# Patient Record
Sex: MALE | Race: WHITE | NOT HISPANIC OR LATINO | Employment: FULL TIME | URBAN - METROPOLITAN AREA
[De-identification: names, ages, dates, MRNs, and addresses within clinical notes are randomized per-mention and may not be internally consistent; named-entity substitution may affect disease eponyms.]

---

## 2023-12-30 ENCOUNTER — HOSPITAL ENCOUNTER (EMERGENCY)
Facility: HOSPITAL | Age: 43
Discharge: HOME | End: 2023-12-30
Attending: EMERGENCY MEDICINE
Payer: MEDICAID

## 2023-12-30 VITALS
TEMPERATURE: 98.1 F | BODY MASS INDEX: 35.16 KG/M2 | HEIGHT: 65 IN | RESPIRATION RATE: 20 BRPM | HEART RATE: 108 BPM | SYSTOLIC BLOOD PRESSURE: 160 MMHG | DIASTOLIC BLOOD PRESSURE: 95 MMHG | OXYGEN SATURATION: 94 % | WEIGHT: 211 LBS

## 2023-12-30 DIAGNOSIS — L02.01 FACIAL ABSCESS: Primary | ICD-10-CM

## 2023-12-30 PROCEDURE — 10061 I&D ABSCESS COMP/MULTIPLE: CPT | Performed by: EMERGENCY MEDICINE

## 2023-12-30 PROCEDURE — 2500000005 HC RX 250 GENERAL PHARMACY W/O HCPCS: Performed by: EMERGENCY MEDICINE

## 2023-12-30 PROCEDURE — 99283 EMERGENCY DEPT VISIT LOW MDM: CPT | Mod: 25

## 2023-12-30 PROCEDURE — 99283 EMERGENCY DEPT VISIT LOW MDM: CPT | Performed by: EMERGENCY MEDICINE

## 2023-12-30 RX ORDER — LIDOCAINE HYDROCHLORIDE 10 MG/ML
10 INJECTION INFILTRATION; PERINEURAL ONCE
Status: COMPLETED | OUTPATIENT
Start: 2023-12-30 | End: 2023-12-30

## 2023-12-30 RX ORDER — CLINDAMYCIN HYDROCHLORIDE 300 MG/1
300 CAPSULE ORAL 3 TIMES DAILY
Qty: 30 CAPSULE | Refills: 0 | Status: SHIPPED | OUTPATIENT
Start: 2023-12-30 | End: 2024-01-09

## 2023-12-30 RX ADMIN — LIDOCAINE HYDROCHLORIDE 100 MG: 10 INJECTION, SOLUTION INFILTRATION; PERINEURAL at 09:28

## 2023-12-30 ASSESSMENT — PAIN - FUNCTIONAL ASSESSMENT: PAIN_FUNCTIONAL_ASSESSMENT: 0-10

## 2023-12-30 ASSESSMENT — COLUMBIA-SUICIDE SEVERITY RATING SCALE - C-SSRS
2. HAVE YOU ACTUALLY HAD ANY THOUGHTS OF KILLING YOURSELF?: NO
1. IN THE PAST MONTH, HAVE YOU WISHED YOU WERE DEAD OR WISHED YOU COULD GO TO SLEEP AND NOT WAKE UP?: NO
6. HAVE YOU EVER DONE ANYTHING, STARTED TO DO ANYTHING, OR PREPARED TO DO ANYTHING TO END YOUR LIFE?: NO

## 2023-12-30 ASSESSMENT — PAIN SCALES - GENERAL: PAINLEVEL_OUTOF10: 7

## 2023-12-30 NOTE — ED PROVIDER NOTES
HPI   Chief Complaint   Patient presents with   • BIG PIMPLE ABSCESS FROM TOOTH ON NOSE        Presents with left facial abscess near the right nasolabial fold.  It has been there for few days.  Nothing has been draining out of it.  It painful locally to the area.  No vision changes.  The patient states he has had multiple of these in the past and has had I&D multiple times over the past 2 years.  Has occurred in the same spot over the past couple years.  He attributed to his bad teeth.  He states that he is following up with a dentist in February.  He denies having any fevers.  He tried to drain it this morning at home without any success.  He has been taking Augmentin for a few days but it has also not helped.                          [place] Coma Scale Score: 15                  Patient History   Past Medical History:   Diagnosis Date   • Asthma    • HTN (hypertension)      No past surgical history on file.  No family history on file.  Social History     Tobacco Use   • Smoking status: Every Day     Types: Cigarettes   • Smokeless tobacco: Never   Substance Use Topics   • Alcohol use: Never   • Drug use: Never       Physical Exam   ED Triage Vitals [12/30/23 0858]   Temp Heart Rate Resp BP   36.7 °C (98.1 °F) 108 20 (!) 160/95      SpO2 Temp src Heart Rate Source Patient Position   94 % -- -- --      BP Location FiO2 (%)     -- --       Physical Exam  Constitutional:       Appearance: Normal appearance.   HENT:      Head: Normocephalic and atraumatic.      Comments: 2 x 1.5 cm abscess noted between the inner canthus and nose on the right-hand side of the face.  No drainage, positive fluctuance, mild erythema  Eyes:      Extraocular Movements: Extraocular movements intact.      Pupils: Pupils are equal, round, and reactive to light.   Musculoskeletal:         General: Normal range of motion.      Cervical back: Normal range of motion.   Skin:     General: Skin is warm and dry.   Neurological:      General: No  focal deficit present.      Mental Status: He is alert and oriented to person, place, and time.      Motor: No weakness.   Psychiatric:         Mood and Affect: Mood normal.       Labs Reviewed - No data to display  No orders to display     ED Course & MDM   Diagnoses as of 12/30/23 0944   Facial abscess       Medical Decision Making  Differentials include facial abscess, infected cyst. Imaging studies, if performed, were independently reviewed and interpreted by myself and confirmed by radiologist. EKG(s), if performed, were interpreted by myself. The patient had incision and drainage of the facial abscess.  A moderate amount of purulent material returned.  I will switch his antibiotic to clindamycin.  He is going to follow-up with his dentist.        Procedure  Incision and Drainage    Performed by: Bubba Mireles MD  Authorized by: Bubba Mireles MD    Consent:     Consent obtained:  Verbal    Consent given by:  Patient    Risks, benefits, and alternatives were discussed: yes      Risks discussed:  Bleeding, incomplete drainage and pain  Universal protocol:     Procedure explained and questions answered to patient or proxy's satisfaction: yes      Patient identity confirmed:  Verbally with patient  Location:     Type:  Abscess    Location:  Head    Head location:  Face  Pre-procedure details:     Skin preparation:  Antiseptic wash  Sedation:     Sedation type:  None  Anesthesia:     Anesthesia method:  Local infiltration    Local anesthetic:  Lidocaine 1% w/o epi  Procedure type:     Complexity:  Complex  Procedure details:     Ultrasound guidance: no      Needle aspiration: no      Incision types:  Cruciate    Incision depth:  Dermal    Wound management:  Probed and deloculated    Drainage:  Purulent    Drainage amount:  Moderate    Wound treatment:  Wound left open    Packing materials:  None  Post-procedure details:     Procedure completion:  Tolerated well, no immediate complications       Bubba Mireles  MD  12/30/23 0944

## 2024-03-12 ENCOUNTER — CLINICAL SUPPORT (OUTPATIENT)
Dept: SLEEP MEDICINE | Facility: CLINIC | Age: 44
End: 2024-03-12
Payer: COMMERCIAL

## 2024-03-12 DIAGNOSIS — G47.33 OBSTRUCTIVE SLEEP APNEA (ADULT) (PEDIATRIC): ICD-10-CM

## 2024-03-12 DIAGNOSIS — G47.61 PERIODIC LIMB MOVEMENT DISORDER: ICD-10-CM

## 2024-03-12 DIAGNOSIS — G47.30 SLEEP APNEA, UNSPECIFIED: ICD-10-CM

## 2024-03-12 PROCEDURE — 95810 POLYSOM 6/> YRS 4/> PARAM: CPT | Mod: 52 | Performed by: INTERNAL MEDICINE

## 2024-03-13 VITALS
WEIGHT: 210.98 LBS | BODY MASS INDEX: 35.15 KG/M2 | SYSTOLIC BLOOD PRESSURE: 160 MMHG | HEIGHT: 65 IN | DIASTOLIC BLOOD PRESSURE: 95 MMHG

## 2024-03-13 ASSESSMENT — SLEEP AND FATIGUE QUESTIONNAIRES
HOW LIKELY ARE YOU TO NOD OFF OR FALL ASLEEP WHILE LYING DOWN TO REST IN THE AFTERNOON WHEN CIRCUMSTANCES PERMIT: MODERATE CHANCE OF DOZING
HOW LIKELY ARE YOU TO NOD OFF OR FALL ASLEEP WHILE SITTING QUIETLY AFTER LUNCH WITHOUT ALCOHOL: SLIGHT CHANCE OF DOZING
HOW LIKELY ARE YOU TO NOD OFF OR FALL ASLEEP IN A CAR, WHILE STOPPED FOR A FEW MINUTES IN TRAFFIC: WOULD NEVER DOZE
ESS-CHAD TOTAL SCORE: 10
HOW LIKELY ARE YOU TO NOD OFF OR FALL ASLEEP WHILE SITTING AND TALKING TO SOMEONE: WOULD NEVER DOZE
HOW LIKELY ARE YOU TO NOD OFF OR FALL ASLEEP WHEN YOU ARE A PASSENGER IN A CAR FOR AN HOUR WITHOUT A BREAK: HIGH CHANCE OF DOZING
SITING INACTIVE IN A PUBLIC PLACE LIKE A CLASS ROOM OR A MOVIE THEATER: MODERATE CHANCE OF DOZING
HOW LIKELY ARE YOU TO NOD OFF OR FALL ASLEEP WHILE SITTING AND READING: WOULD NEVER DOZE
HOW LIKELY ARE YOU TO NOD OFF OR FALL ASLEEP WHILE WATCHING TV: MODERATE CHANCE OF DOZING

## 2024-03-13 NOTE — PROGRESS NOTES
[facility] TECH NOTE:     Patient: Murali Love   MRN//AGE: [id number]  1980  44 y.o.   Technologist: Toya Murillo   Room: 4   Service Date: 3/13/2024        Sleep Testing Location: [facility]:     TECHNOLOGIST SLEEP STUDY PROCEDURE NOTE:   This sleep study is being conducted according to the policies and procedures outlined by the [employer]AS accreditation standards.  The sleep study procedure and processes involved during this appointment was explained to the patient/patient’s family, questions were answered. The patient/family verbalized understanding.      The patient is a 44 y.o. year old male scheduled for a Split Night Study  with montage of:  standard . he arrived for his appointment.      The study that was ultimately completed was a diagnostic PSG  with montage of:  standard .    The full study Was completed.  Patient questionnaires completed?: yes     Consents signed? yes    Initial Fall Risk Screening:     Murali has not fallen in the last 6 months. his did not result in injury. Murali does not have a fear of falling. He does not need assistance with sitting, standing, or walking. he does not need assistance walking in his home. he does not need assistance in an unfamiliar setting. The patient is notusing an assistive device.     Brief Study observations: The on-call (Dr. Burkett) was contacted due to low sats (per protocol to add supplemental 02 ) and Dr. Burkett advised to monitor the patient sats and try to wait an hour to see if sats increase as not to mask possible apneas. If sats remain low tech may add 1L 02. The patients sats increased so supplemental 02 was not added.     Deviation to order/protocol and reason: The patient did not demonstrate enough respiratory events to meet split criteria.       If PAP, which was preferred mask/pressure/mode: N/A      Other: The patient was becoming restless towards the last hour of the study and wanted to end it early. The full (720 epoch) study recording time  was completed.     After the procedure, the patient/family was informed to ensure followup with ordering clinician for testing results.      Technologist: Toya Murillo

## 2024-09-02 ENCOUNTER — HOSPITAL ENCOUNTER (INPATIENT)
Facility: HOSPITAL | Age: 44
LOS: 1 days | Discharge: HOME | End: 2024-09-03
Attending: STUDENT IN AN ORGANIZED HEALTH CARE EDUCATION/TRAINING PROGRAM | Admitting: INTERNAL MEDICINE
Payer: COMMERCIAL

## 2024-09-02 ENCOUNTER — APPOINTMENT (OUTPATIENT)
Dept: CARDIOLOGY | Facility: HOSPITAL | Age: 44
End: 2024-09-02
Payer: COMMERCIAL

## 2024-09-02 DIAGNOSIS — I21.11 ST ELEVATION (STEMI) MYOCARDIAL INFARCTION INVOLVING RIGHT CORONARY ARTERY (MULTI): ICD-10-CM

## 2024-09-02 DIAGNOSIS — I21.3 STEMI (ST ELEVATION MYOCARDIAL INFARCTION) (MULTI): ICD-10-CM

## 2024-09-02 DIAGNOSIS — I21.3 ST ELEVATION MYOCARDIAL INFARCTION (STEMI), UNSPECIFIED ARTERY (MULTI): Primary | ICD-10-CM

## 2024-09-02 DIAGNOSIS — F17.200 TOBACCO USE DISORDER: ICD-10-CM

## 2024-09-02 LAB
ACT BLD: 187 SEC (ref 83–199)
ACT BLD: NORMAL S
ALBUMIN SERPL BCP-MCNC: 4.6 G/DL (ref 3.4–5)
ALP SERPL-CCNC: 75 U/L (ref 33–120)
ALT SERPL W P-5'-P-CCNC: 25 U/L (ref 10–52)
ANION GAP SERPL CALC-SCNC: 15 MMOL/L (ref 10–20)
AST SERPL W P-5'-P-CCNC: 25 U/L (ref 9–39)
BASOPHILS # BLD AUTO: 0.12 X10*3/UL (ref 0–0.1)
BASOPHILS NFR BLD AUTO: 0.8 %
BILIRUB SERPL-MCNC: 0.6 MG/DL (ref 0–1.2)
BUN SERPL-MCNC: 17 MG/DL (ref 6–23)
CALCIUM SERPL-MCNC: 9.7 MG/DL (ref 8.6–10.3)
CARDIAC TROPONIN I PNL SERPL HS: 9 NG/L (ref 0–20)
CHLORIDE SERPL-SCNC: 101 MMOL/L (ref 98–107)
CO2 SERPL-SCNC: 20 MMOL/L (ref 21–32)
CREAT SERPL-MCNC: 1.36 MG/DL (ref 0.5–1.3)
EGFRCR SERPLBLD CKD-EPI 2021: 65 ML/MIN/1.73M*2
EOSINOPHIL # BLD AUTO: 0.24 X10*3/UL (ref 0–0.7)
EOSINOPHIL NFR BLD AUTO: 1.6 %
ERYTHROCYTE [DISTWIDTH] IN BLOOD BY AUTOMATED COUNT: 15.4 % (ref 11.5–14.5)
GLUCOSE BLD MANUAL STRIP-MCNC: 164 MG/DL (ref 74–99)
GLUCOSE SERPL-MCNC: 154 MG/DL (ref 74–99)
HCT VFR BLD AUTO: 56 % (ref 41–52)
HGB BLD-MCNC: 19.2 G/DL (ref 13.5–17.5)
HOLD SPECIMEN: NORMAL
IMM GRANULOCYTES # BLD AUTO: 0.04 X10*3/UL (ref 0–0.7)
IMM GRANULOCYTES NFR BLD AUTO: 0.3 % (ref 0–0.9)
LYMPHOCYTES # BLD AUTO: 4.82 X10*3/UL (ref 1.2–4.8)
LYMPHOCYTES NFR BLD AUTO: 31.6 %
MCH RBC QN AUTO: 28.2 PG (ref 26–34)
MCHC RBC AUTO-ENTMCNC: 34.3 G/DL (ref 32–36)
MCV RBC AUTO: 82 FL (ref 80–100)
MONOCYTES # BLD AUTO: 1.19 X10*3/UL (ref 0.1–1)
MONOCYTES NFR BLD AUTO: 7.8 %
NEUTROPHILS # BLD AUTO: 8.83 X10*3/UL (ref 1.2–7.7)
NEUTROPHILS NFR BLD AUTO: 57.9 %
NRBC BLD-RTO: 0 /100 WBCS (ref 0–0)
PLATELET # BLD AUTO: 248 X10*3/UL (ref 150–450)
POTASSIUM SERPL-SCNC: 3.8 MMOL/L (ref 3.5–5.3)
PROT SERPL-MCNC: 7.3 G/DL (ref 6.4–8.2)
RBC # BLD AUTO: 6.8 X10*6/UL (ref 4.5–5.9)
SODIUM SERPL-SCNC: 132 MMOL/L (ref 136–145)
WBC # BLD AUTO: 15.2 X10*3/UL (ref 4.4–11.3)

## 2024-09-02 PROCEDURE — 2020000001 HC ICU ROOM DAILY

## 2024-09-02 PROCEDURE — 99153 MOD SED SAME PHYS/QHP EA: CPT | Performed by: INTERNAL MEDICINE

## 2024-09-02 PROCEDURE — 2500000002 HC RX 250 W HCPCS SELF ADMINISTERED DRUGS (ALT 637 FOR MEDICARE OP, ALT 636 FOR OP/ED): Performed by: INTERNAL MEDICINE

## 2024-09-02 PROCEDURE — 99254 IP/OBS CNSLTJ NEW/EST MOD 60: CPT | Performed by: STUDENT IN AN ORGANIZED HEALTH CARE EDUCATION/TRAINING PROGRAM

## 2024-09-02 PROCEDURE — 4A023N7 MEASUREMENT OF CARDIAC SAMPLING AND PRESSURE, LEFT HEART, PERCUTANEOUS APPROACH: ICD-10-PCS | Performed by: INTERNAL MEDICINE

## 2024-09-02 PROCEDURE — 2500000001 HC RX 250 WO HCPCS SELF ADMINISTERED DRUGS (ALT 637 FOR MEDICARE OP): Performed by: STUDENT IN AN ORGANIZED HEALTH CARE EDUCATION/TRAINING PROGRAM

## 2024-09-02 PROCEDURE — C9606 PERC D-E COR REVASC W AMI S: HCPCS | Performed by: INTERNAL MEDICINE

## 2024-09-02 PROCEDURE — C1769 GUIDE WIRE: HCPCS | Performed by: INTERNAL MEDICINE

## 2024-09-02 PROCEDURE — 83036 HEMOGLOBIN GLYCOSYLATED A1C: CPT | Performed by: INTERNAL MEDICINE

## 2024-09-02 PROCEDURE — 2780000003 HC OR 278 NO HCPCS: Performed by: INTERNAL MEDICINE

## 2024-09-02 PROCEDURE — 84484 ASSAY OF TROPONIN QUANT: CPT | Performed by: STUDENT IN AN ORGANIZED HEALTH CARE EDUCATION/TRAINING PROGRAM

## 2024-09-02 PROCEDURE — 93010 ELECTROCARDIOGRAM REPORT: CPT | Performed by: INTERNAL MEDICINE

## 2024-09-02 PROCEDURE — C1887 CATHETER, GUIDING: HCPCS | Performed by: INTERNAL MEDICINE

## 2024-09-02 PROCEDURE — 82947 ASSAY GLUCOSE BLOOD QUANT: CPT

## 2024-09-02 PROCEDURE — 2500000005 HC RX 250 GENERAL PHARMACY W/O HCPCS: Performed by: INTERNAL MEDICINE

## 2024-09-02 PROCEDURE — 99291 CRITICAL CARE FIRST HOUR: CPT | Performed by: INTERNAL MEDICINE

## 2024-09-02 PROCEDURE — S4991 NICOTINE PATCH NONLEGEND: HCPCS | Performed by: STUDENT IN AN ORGANIZED HEALTH CARE EDUCATION/TRAINING PROGRAM

## 2024-09-02 PROCEDURE — 2500000004 HC RX 250 GENERAL PHARMACY W/ HCPCS (ALT 636 FOR OP/ED): Performed by: INTERNAL MEDICINE

## 2024-09-02 PROCEDURE — 93458 L HRT ARTERY/VENTRICLE ANGIO: CPT | Performed by: INTERNAL MEDICINE

## 2024-09-02 PROCEDURE — C1725 CATH, TRANSLUMIN NON-LASER: HCPCS | Performed by: INTERNAL MEDICINE

## 2024-09-02 PROCEDURE — 85347 COAGULATION TIME ACTIVATED: CPT

## 2024-09-02 PROCEDURE — 80053 COMPREHEN METABOLIC PANEL: CPT | Performed by: STUDENT IN AN ORGANIZED HEALTH CARE EDUCATION/TRAINING PROGRAM

## 2024-09-02 PROCEDURE — 92978 ENDOLUMINL IVUS OCT C 1ST: CPT | Performed by: INTERNAL MEDICINE

## 2024-09-02 PROCEDURE — 36415 COLL VENOUS BLD VENIPUNCTURE: CPT | Performed by: STUDENT IN AN ORGANIZED HEALTH CARE EDUCATION/TRAINING PROGRAM

## 2024-09-02 PROCEDURE — 2720000007 HC OR 272 NO HCPCS: Performed by: INTERNAL MEDICINE

## 2024-09-02 PROCEDURE — 2500000004 HC RX 250 GENERAL PHARMACY W/ HCPCS (ALT 636 FOR OP/ED): Performed by: STUDENT IN AN ORGANIZED HEALTH CARE EDUCATION/TRAINING PROGRAM

## 2024-09-02 PROCEDURE — 93005 ELECTROCARDIOGRAM TRACING: CPT

## 2024-09-02 PROCEDURE — 2500000002 HC RX 250 W HCPCS SELF ADMINISTERED DRUGS (ALT 637 FOR MEDICARE OP, ALT 636 FOR OP/ED): Performed by: STUDENT IN AN ORGANIZED HEALTH CARE EDUCATION/TRAINING PROGRAM

## 2024-09-02 PROCEDURE — 2550000001 HC RX 255 CONTRASTS: Performed by: INTERNAL MEDICINE

## 2024-09-02 PROCEDURE — C1760 CLOSURE DEV, VASC: HCPCS | Performed by: INTERNAL MEDICINE

## 2024-09-02 PROCEDURE — C1753 CATH, INTRAVAS ULTRASOUND: HCPCS | Performed by: INTERNAL MEDICINE

## 2024-09-02 PROCEDURE — 96374 THER/PROPH/DIAG INJ IV PUSH: CPT | Mod: 59

## 2024-09-02 PROCEDURE — 99291 CRITICAL CARE FIRST HOUR: CPT

## 2024-09-02 PROCEDURE — 99152 MOD SED SAME PHYS/QHP 5/>YRS: CPT | Performed by: INTERNAL MEDICINE

## 2024-09-02 PROCEDURE — 85025 COMPLETE CBC W/AUTO DIFF WBC: CPT | Performed by: STUDENT IN AN ORGANIZED HEALTH CARE EDUCATION/TRAINING PROGRAM

## 2024-09-02 PROCEDURE — 2500000001 HC RX 250 WO HCPCS SELF ADMINISTERED DRUGS (ALT 637 FOR MEDICARE OP): Performed by: INTERNAL MEDICINE

## 2024-09-02 PROCEDURE — B2111ZZ FLUOROSCOPY OF MULTIPLE CORONARY ARTERIES USING LOW OSMOLAR CONTRAST: ICD-10-PCS | Performed by: INTERNAL MEDICINE

## 2024-09-02 PROCEDURE — 027034Z DILATION OF CORONARY ARTERY, ONE ARTERY WITH DRUG-ELUTING INTRALUMINAL DEVICE, PERCUTANEOUS APPROACH: ICD-10-PCS | Performed by: INTERNAL MEDICINE

## 2024-09-02 PROCEDURE — B240ZZ3 ULTRASONOGRAPHY OF SINGLE CORONARY ARTERY, INTRAVASCULAR: ICD-10-PCS | Performed by: INTERNAL MEDICINE

## 2024-09-02 PROCEDURE — C1894 INTRO/SHEATH, NON-LASER: HCPCS | Performed by: INTERNAL MEDICINE

## 2024-09-02 PROCEDURE — 92941 PRQ TRLML REVSC TOT OCCL AMI: CPT | Performed by: INTERNAL MEDICINE

## 2024-09-02 PROCEDURE — 96373 THER/PROPH/DIAG INJ IA: CPT | Performed by: INTERNAL MEDICINE

## 2024-09-02 PROCEDURE — C1874 STENT, COATED/COV W/DEL SYS: HCPCS | Performed by: INTERNAL MEDICINE

## 2024-09-02 DEVICE — STENT ONYXNG25022UX ONYX 2.50X22RX
Type: IMPLANTABLE DEVICE | Site: CORONARY | Status: FUNCTIONAL
Brand: ONYX FRONTIER™

## 2024-09-02 RX ORDER — LANOLIN ALCOHOL/MO/W.PET/CERES
400 CREAM (GRAM) TOPICAL
COMMUNITY
Start: 2024-08-13

## 2024-09-02 RX ORDER — GABAPENTIN 600 MG/1
600 TABLET ORAL DAILY
Status: DISCONTINUED | OUTPATIENT
Start: 2024-09-02 | End: 2024-09-03 | Stop reason: HOSPADM

## 2024-09-02 RX ORDER — NITROGLYCERIN 0.4 MG/1
0.4 TABLET SUBLINGUAL EVERY 5 MIN PRN
Status: DISCONTINUED | OUTPATIENT
Start: 2024-09-02 | End: 2024-09-02

## 2024-09-02 RX ORDER — ALBUTEROL SULFATE 90 UG/1
2 INHALANT RESPIRATORY (INHALATION) EVERY 6 HOURS PRN
COMMUNITY
Start: 2024-08-15

## 2024-09-02 RX ORDER — IBUPROFEN 200 MG
1 TABLET ORAL DAILY
Status: DISCONTINUED | OUTPATIENT
Start: 2024-09-02 | End: 2024-09-03 | Stop reason: HOSPADM

## 2024-09-02 RX ORDER — HEPARIN SODIUM 5000 [USP'U]/ML
4000 INJECTION, SOLUTION INTRAVENOUS; SUBCUTANEOUS ONCE
Status: COMPLETED | OUTPATIENT
Start: 2024-09-02 | End: 2024-09-02

## 2024-09-02 RX ORDER — NITROGLYCERIN 5 MG/ML
INJECTION, SOLUTION INTRAVENOUS AS NEEDED
Status: DISCONTINUED | OUTPATIENT
Start: 2024-09-02 | End: 2024-09-02 | Stop reason: HOSPADM

## 2024-09-02 RX ORDER — NAPROXEN SODIUM 220 MG/1
324 TABLET, FILM COATED ORAL ONCE
Status: COMPLETED | OUTPATIENT
Start: 2024-09-02 | End: 2024-09-02

## 2024-09-02 RX ORDER — ATORVASTATIN CALCIUM 40 MG/1
1 TABLET, FILM COATED ORAL
COMMUNITY
Start: 2024-07-18 | End: 2024-09-03 | Stop reason: HOSPADM

## 2024-09-02 RX ORDER — METOPROLOL TARTRATE 25 MG/1
25 TABLET, FILM COATED ORAL 2 TIMES DAILY
Status: DISCONTINUED | OUTPATIENT
Start: 2024-09-02 | End: 2024-09-03 | Stop reason: HOSPADM

## 2024-09-02 RX ORDER — LISINOPRIL 10 MG/1
1 TABLET ORAL
COMMUNITY
Start: 2024-07-18

## 2024-09-02 RX ORDER — ATORVASTATIN CALCIUM 40 MG/1
80 TABLET, FILM COATED ORAL NIGHTLY
Status: DISCONTINUED | OUTPATIENT
Start: 2024-09-02 | End: 2024-09-03 | Stop reason: HOSPADM

## 2024-09-02 RX ORDER — LISINOPRIL 10 MG/1
10 TABLET ORAL
Status: DISCONTINUED | OUTPATIENT
Start: 2024-09-02 | End: 2024-09-03 | Stop reason: HOSPADM

## 2024-09-02 RX ORDER — LIDOCAINE HYDROCHLORIDE 20 MG/ML
INJECTION, SOLUTION INFILTRATION; PERINEURAL AS NEEDED
Status: DISCONTINUED | OUTPATIENT
Start: 2024-09-02 | End: 2024-09-02 | Stop reason: HOSPADM

## 2024-09-02 RX ORDER — SODIUM CHLORIDE 9 MG/ML
100 INJECTION, SOLUTION INTRAVENOUS CONTINUOUS
Status: ACTIVE | OUTPATIENT
Start: 2024-09-02 | End: 2024-09-03

## 2024-09-02 RX ORDER — ALBUTEROL SULFATE 90 UG/1
2 INHALANT RESPIRATORY (INHALATION) EVERY 6 HOURS PRN
Status: DISCONTINUED | OUTPATIENT
Start: 2024-09-02 | End: 2024-09-03 | Stop reason: HOSPADM

## 2024-09-02 RX ORDER — HEPARIN SODIUM 1000 [USP'U]/ML
INJECTION, SOLUTION INTRAVENOUS; SUBCUTANEOUS AS NEEDED
Status: DISCONTINUED | OUTPATIENT
Start: 2024-09-02 | End: 2024-09-02 | Stop reason: HOSPADM

## 2024-09-02 RX ORDER — GABAPENTIN 600 MG/1
1 TABLET ORAL
COMMUNITY
Start: 2024-08-14

## 2024-09-02 RX ORDER — MIDAZOLAM HYDROCHLORIDE 1 MG/ML
INJECTION, SOLUTION INTRAMUSCULAR; INTRAVENOUS AS NEEDED
Status: DISCONTINUED | OUTPATIENT
Start: 2024-09-02 | End: 2024-09-02 | Stop reason: HOSPADM

## 2024-09-02 RX ORDER — ASPIRIN 81 MG/1
81 TABLET ORAL DAILY
Status: DISCONTINUED | OUTPATIENT
Start: 2024-09-03 | End: 2024-09-03 | Stop reason: HOSPADM

## 2024-09-02 RX ORDER — DULAGLUTIDE 0.75 MG/.5ML
0.75 INJECTION, SOLUTION SUBCUTANEOUS
COMMUNITY
Start: 2024-02-05

## 2024-09-02 RX ORDER — FENTANYL CITRATE 50 UG/ML
INJECTION, SOLUTION INTRAMUSCULAR; INTRAVENOUS AS NEEDED
Status: DISCONTINUED | OUTPATIENT
Start: 2024-09-02 | End: 2024-09-02 | Stop reason: HOSPADM

## 2024-09-02 SDOH — SOCIAL STABILITY: SOCIAL INSECURITY: DO YOU FEEL ANYONE HAS EXPLOITED OR TAKEN ADVANTAGE OF YOU FINANCIALLY OR OF YOUR PERSONAL PROPERTY?: NO

## 2024-09-02 SDOH — SOCIAL STABILITY: SOCIAL INSECURITY: DO YOU FEEL UNSAFE GOING BACK TO THE PLACE WHERE YOU ARE LIVING?: NO

## 2024-09-02 SDOH — SOCIAL STABILITY: SOCIAL INSECURITY: DOES ANYONE TRY TO KEEP YOU FROM HAVING/CONTACTING OTHER FRIENDS OR DOING THINGS OUTSIDE YOUR HOME?: NO

## 2024-09-02 SDOH — SOCIAL STABILITY: SOCIAL INSECURITY: HAVE YOU HAD ANY THOUGHTS OF HARMING ANYONE ELSE?: NO

## 2024-09-02 SDOH — SOCIAL STABILITY: SOCIAL INSECURITY: HAVE YOU HAD THOUGHTS OF HARMING ANYONE ELSE?: NO

## 2024-09-02 SDOH — SOCIAL STABILITY: SOCIAL INSECURITY: HAS ANYONE EVER THREATENED TO HURT YOUR FAMILY OR YOUR PETS?: NO

## 2024-09-02 SDOH — SOCIAL STABILITY: SOCIAL INSECURITY: ABUSE: ADULT

## 2024-09-02 SDOH — SOCIAL STABILITY: SOCIAL INSECURITY: ARE THERE ANY APPARENT SIGNS OF INJURIES/BEHAVIORS THAT COULD BE RELATED TO ABUSE/NEGLECT?: NO

## 2024-09-02 SDOH — SOCIAL STABILITY: SOCIAL INSECURITY: ARE YOU OR HAVE YOU BEEN THREATENED OR ABUSED PHYSICALLY, EMOTIONALLY, OR SEXUALLY BY ANYONE?: NO

## 2024-09-02 SDOH — SOCIAL STABILITY: SOCIAL INSECURITY: WERE YOU ABLE TO COMPLETE ALL THE BEHAVIORAL HEALTH SCREENINGS?: YES

## 2024-09-02 ASSESSMENT — PAIN SCALES - GENERAL
PAINLEVEL_OUTOF10: 7
PAINLEVEL_OUTOF10: 0 - NO PAIN
PAINLEVEL_OUTOF10: 1
PAINLEVEL_OUTOF10: 0 - NO PAIN
PAINLEVEL_OUTOF10: 7
PAINLEVEL_OUTOF10: 0 - NO PAIN
PAINLEVEL_OUTOF10: 4
PAINLEVEL_OUTOF10: 0 - NO PAIN

## 2024-09-02 ASSESSMENT — PAIN DESCRIPTION - LOCATION
LOCATION: CHEST
LOCATION: CHEST

## 2024-09-02 ASSESSMENT — PATIENT HEALTH QUESTIONNAIRE - PHQ9
1. LITTLE INTEREST OR PLEASURE IN DOING THINGS: NOT AT ALL
SUM OF ALL RESPONSES TO PHQ9 QUESTIONS 1 & 2: 0
2. FEELING DOWN, DEPRESSED OR HOPELESS: NOT AT ALL

## 2024-09-02 ASSESSMENT — ENCOUNTER SYMPTOMS
MUSCULOSKELETAL NEGATIVE: 1
ABDOMINAL PAIN: 0
NEUROLOGICAL NEGATIVE: 1
SHORTNESS OF BREATH: 1
DIARRHEA: 0
VOMITING: 0
NAUSEA: 1

## 2024-09-02 ASSESSMENT — PAIN - FUNCTIONAL ASSESSMENT
PAIN_FUNCTIONAL_ASSESSMENT: 0-10

## 2024-09-02 ASSESSMENT — ACTIVITIES OF DAILY LIVING (ADL)
FEEDING YOURSELF: INDEPENDENT
BATHING: INDEPENDENT
HEARING - LEFT EAR: FUNCTIONAL
PATIENT'S MEMORY ADEQUATE TO SAFELY COMPLETE DAILY ACTIVITIES?: YES
JUDGMENT_ADEQUATE_SAFELY_COMPLETE_DAILY_ACTIVITIES: YES
HEARING - RIGHT EAR: FUNCTIONAL
WALKS IN HOME: INDEPENDENT
TOILETING: INDEPENDENT
DRESSING YOURSELF: INDEPENDENT
ADEQUATE_TO_COMPLETE_ADL: YES
LACK_OF_TRANSPORTATION: NO
GROOMING: INDEPENDENT

## 2024-09-02 ASSESSMENT — PAIN DESCRIPTION - PAIN TYPE
TYPE: ACUTE PAIN
TYPE: ACUTE PAIN

## 2024-09-02 ASSESSMENT — COGNITIVE AND FUNCTIONAL STATUS - GENERAL
MOBILITY SCORE: 24
PATIENT BASELINE BEDBOUND: NO
DAILY ACTIVITIY SCORE: 24

## 2024-09-02 ASSESSMENT — LIFESTYLE VARIABLES
HOW OFTEN DO YOU HAVE A DRINK CONTAINING ALCOHOL: NEVER
AUDIT-C TOTAL SCORE: 0
AUDIT-C TOTAL SCORE: 0
HOW MANY STANDARD DRINKS CONTAINING ALCOHOL DO YOU HAVE ON A TYPICAL DAY: PATIENT DOES NOT DRINK
HOW OFTEN DO YOU HAVE 6 OR MORE DRINKS ON ONE OCCASION: NEVER
SKIP TO QUESTIONS 9-10: 1

## 2024-09-02 ASSESSMENT — PAIN DESCRIPTION - DESCRIPTORS
DESCRIPTORS: TIGHTNESS

## 2024-09-02 ASSESSMENT — PAIN DESCRIPTION - ORIENTATION: ORIENTATION: LEFT

## 2024-09-02 NOTE — Clinical Note
Angioplasty of the RPDA lesion. Inflation 1: Pressure = 14 clarence; Duration = 10 sec. Inflation 2: Pressure = 16 clarence; Duration = 8 sec.

## 2024-09-02 NOTE — Clinical Note
Angioplasty of the right coronary artery lesion. Inflation 1: Pressure = 12 clarence; Duration = 7 sec. Inflation 2: Pressure = 12 clarence; Duration = 8 sec. DTB time

## 2024-09-02 NOTE — Clinical Note
Angioplasty of the RPDA lesion. Inflation 1: Pressure = 12 clarence; Duration = 8 sec. Inflation 2: Pressure = 14 clarence; Duration = 8 sec.

## 2024-09-02 NOTE — Clinical Note
Vessel: RCA (RPDA). Stent inserted. Inflation 1: Pressure = 12 clarence; Duration = 10 sec. Stent deployed

## 2024-09-02 NOTE — ED PROVIDER NOTES
[facility] EMERGENCY DEPARTMENT ENCOUNTER    Pt Name:Murali Love  MRN: [id number]  Birthdate 1980  Date of evaluation: 09/02/24  PCP:  Marlys Srivastava, DUC-ARIELLA    CHIEF COMPLAINT       Chief Complaint   Patient presents with    Chest Pain     Sudden onset chest tightness 25 min PTA with sob radiating up into neck.     HISTORY OF PRESENT ILLNESS  (Location/Symptom, Timing/Onset, Context/Setting, Quality, Duration, Modifying Factors, Severity.)      Murali Love is a 44 y.o. male who presents with sudden onset midsternal chest pain that began 25 minutes prior to arrival and was accompanied by shortness of breath. He states he has never had a history of any heart attacks or cardiac history that he knows of. He has a history of HTN and asthma. On arrival, EKG showing inferior STEMI. STEMI protocol activated.    PAST MEDICAL / SURGICAL / SOCIAL / FAMILY HISTORY      has a past medical history of Asthma ([facility]) and HTN (hypertension).       has no past surgical history on file.      Social History     Socioeconomic History    Marital status: Legally      Spouse name: Not on file    Number of children: Not on file    Years of education: Not on file    Highest education level: Not on file   Occupational History    Not on file   Tobacco Use    Smoking status: Every Day     Types: Cigarettes    Smokeless tobacco: Never   Substance and Sexual Activity    Alcohol use: Never    Drug use: Never    Sexual activity: Not on file   Other Topics Concern    Not on file   Social History Narrative    Not on file     Social Determinants of Health     Financial Resource Strain: Not on file   Food Insecurity: Not on file   Transportation Needs: Not on file   Physical Activity: Not on file   Stress: Not on file   Social Connections: Not on file   Intimate Partner Violence: Not on file   Housing Stability: Not on file       No family history on file.    Allergies:  Patient has no known allergies.    Home  "Medications:  Prior to Admission medications    Medication Sig Start Date End Date Taking? Authorizing Provider   albuterol 90 mcg/actuation inhaler Inhale 2 puffs every 6 hours if needed. 8/15/24  Yes Historical Provider, MD   atorvastatin (Lipitor) 40 mg tablet Take 1 tablet (40 mg) by mouth early in the morning.. 7/18/24  Yes Historical Provider, MD   gabapentin (Neurontin) 600 mg tablet Take 1 tablet (600 mg) by mouth 3 times a day. 8/14/24  Yes Historical Provider, MD   lisinopril 10 mg tablet Take 1 tablet (10 mg) by mouth early in the morning.. 7/18/24  Yes Historical Provider, MD   magnesium oxide (Mag-Ox) 400 mg (241.3 mg magnesium) tablet Take 1 tablet (400 mg) by mouth 1 time if needed. Asneeded before bed for leg cramps 8/13/24  Yes Historical Provider, MD   Trulicity 0.75 mg/0.5 mL pen injector Inject 0.75 mg under the skin 1 (one) time per week. 2/5/24  Yes Historical Provider, MD       REVIEW OF SYSTEMS       Review of Systems   HENT: Negative.     Respiratory:  Positive for shortness of breath.    Cardiovascular:  Positive for chest pain.   Gastrointestinal:  Positive for nausea. Negative for abdominal pain, diarrhea and vomiting.   Genitourinary: Negative.    Musculoskeletal: Negative.    Neurological: Negative.        PHYSICAL EXAM      INITIAL VITALS:   BP (!) 177/112   Pulse 97   Temp 37 °C (98.6 °F) (Skin)   Resp 18   Ht 1.676 m (5' 6\")   Wt 89.6 kg (197 lb 8.5 oz)   SpO2 100%   BMI 31.88 kg/m²     Physical Exam  Vitals reviewed.   Constitutional:       Appearance: He is diaphoretic.   Cardiovascular:      Rate and Rhythm: Regular rhythm. Tachycardia present.   Pulmonary:      Effort: Pulmonary effort is normal.      Breath sounds: Normal breath sounds. No decreased breath sounds, wheezing, rhonchi or rales.   Musculoskeletal:         General: Normal range of motion.   Neurological:      General: No focal deficit present.      Mental Status: He is alert.           DDX/DIAGNOSTIC RESULTS / " EMERGENCY DEPARTMENT COURSE / [facility]     Medical Decision Making  44-year-old male presents with midsternal nonradiating chest pain meaning 25 minutes prior to arrival.  No known cardiac history.  On arrival, EKG showing inferior STEMI with reciprocal depression in aVF.  STEMI protocol followed and STEMI activated at 1138.  Cardiology aware and en route to transfer patient to Cath Lab.  Patient given 3 to 4 mg aspirin, Brilinta, and 4000 units heparin.  Placed on O2, [place], IV access established. Family and patient updated. Patient to be taken directly to cath lab.        EKG  EKG, interpreted by me: Atrial fibrillation. ST elevation in II, III, and AVL with reciprocal depression in AVF. Inferior STEMI identified.    All EKG's are interpreted by the Emergency Department Physician who either signs or Co-signs this chart in the absence of a cardiologist.    EMERGENCY DEPARTMENT COURSE:    ED Course as of 09/02/24 1151   Mon Sep 02, 2024   1144 STEMI activated at 11:38. Dr. Briones aware and en route. STEMI protocol in place. Given 324 mg ASA, brilinta and 4000 units Heparin. [JG]      ED Course User Index  [JG] Shawna Aden MD         Diagnoses as of 09/02/24 1151   ST elevation myocardial infarction (STEMI), unspecified artery (Multi)     CONSULTS:  None    CRITICAL CARE:  There was significant risk of life threatening deterioration of patient's condition requiring my direct management. Critical care time 30 minutes, excluding any documented procedures.    FINAL IMPRESSION      1. ST elevation myocardial infarction (STEMI), unspecified artery (Multi)          DISPOSITION / PLAN     Admit    PATIENT REFERRED TO:  No follow-up provider specified.    DISCHARGE MEDICATIONS:  New Prescriptions    No medications on file       Shawna Aden MD  Emergency Medicine Attending Physician    (Please note that portions of this note were completed with a voice recognition program.  Efforts were made to edit the  dictations but occasionally words are mis-transcribed.)     Shawna Aden MD  09/02/24 4304

## 2024-09-02 NOTE — CONSULTS
Inpatient consult to Medicine  Consult performed by: Jolene Sibley PA-C  Consult ordered by: Nathanael Briones MD        [facility] General Medicine Consultation Note    ASSESSMENT and PLAN:     Murali Love is a 44 y.o. male with past medical history s/f HTN, DLD, T2DM c/b peripheral neuropathy, asthma, tobacco use disorder, who presented with midsternal chest pain which began within an hour prior to arrival to the ED. Was found to have inferior STEMI s/p PCI to RCA (PDA) (09/02/2024) with Dr. Briones. Medicine was consulted for medical management i/s/o other chronic comorbidities.     Inferior STEMI s/p PCI to RCA (PDA) on 09/02/2024 with Dr. Briones   -Patient underwent LHC (access: radial - R; site appears overall well; band still in place)  -Denies current CP or anginal symptoms, sitting in bed comfortably    -Family Hx: significant premature CAD history in 30s + 40s (younger brother - MI @ 32; mother - MI x 5, starting in 30s; grandparents - before 50)   -To be continued on ASA + Brilinta  -To be continued on Atorvastatin 80mg (increased from home 40mg nightly)   -Metoprolol 25mg BID  -To be monitored on tele and have echo obtained   -To be monitored closely in the ICU overnight  -Labs: lipid panel, A1c, morning labs   -Majority of management at this time as per primary service/cardiology    Leukocytosis   -Patient does have an initial leukocytosis with WBCs 15.2; he denies any systemic infectious symptoms and has no evidence of obvious infectious source on examination  -No sick contacts or exposures  -Possibly reactionary/stress response; will continue to monitor and pursue additional evaluation if worsening or development of documented fever or other infectious symptoms overnight    Elevated serum creatinine, possible degree of CKD  -Patient reports that he was told he was in stage III kidney failure on presentation to the emergency department today, but reports no prior diagnosis that he is aware of (of  "kidney disease)  -Question if patient has an underlying diabetic nephropathy; serum creatinine on presentation is 1.36 with a BUN of 17  -Will continue to trend while admitted, if worsening consider holding home lisinopril and ordering UA and renal indices for better delineation    HTN, DLD   -Resumed on home lisinopril on admission   -Metoprolol 25mg BID added as above   -Atorva 40mg --> 80mg; lipid panel pending   -BP slightly elevated on presentation (possibly d/t stress response with above), improved, will monitor closely in ICU overnight and adjust PRN    T2DM c/b peripheral neuropathy   -Hold home Trulicity on admit (takes every Saturday)  -Has been started on SSI before meals, continue   -Accucheks, hypoglycemic protocol   -Update A1c if not recently done (not in system / care everywhere at this time) --> pt reports last known A1c ~6.3% (down from ~11% at time of dx 6 months prior)  -Continue home gabapentin     Asthma without acute exacerbation   -Continue home therapies   -Continue follow-up with PCP as scheduled     Tobacco Use Disorder   -Smoking cessation education provided during hospitalization   -NRT discussed and offered, declined need for this at this time   -Currently smoking < 0.5 ppd     Obesity (BMI 31.88)  -Patient does not meet morbid/severe criteria for obesity on admission   -Continued outpatient follow-up with PCP, healthy dietary and lifestyle changes as able      Hyponatremia, mild   -Na 132 on presentation; continue to monitor. Pt euvolemic and has had good intake. No EtOH. No diuretics at home. Continue to follow.     Secondary polycythemia   -RBCs 6.8, Hgb 19.2, Hct 56.0 -- likely 2/2 smoking/tobacco use   -Continue to trend while admitted     Jolene Sibley PA-C  General Inpatient Medicine (\"IMS\") / Hospitalist Service  Available via [employer] [phone]. Outside of these hours, please contact providers assigned to the team and/or via the Medicine Acute Pager.    I spent a " total of 60 minutes in the overall professional care of this patient on this date of service.    Dragon dictation software was used to dictate this note and thus there may be minor errors in translation/transcription including garbled speech or misspellings. Please contact for clarification if needed.    HISTORY OF PRESENT ILLNESS:     History Of Present Illness:    Murali Love is a 44 y.o. male with a significant past medical history of HTN, DLD, T2DM c/b peripheral neuropathy, asthma, tobacco use disorder, who presented with midsternal chest pain which began within an hour prior to arrival to the ED. 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  He took a break and made his sister and nephew food for lunch, as they were going on a picnic; after returning to his car to drive back to his house after dropping off the food, he developed midsternal chest pain. This was this was associated with radiation up into the neck, both shoulders + arms; also associated with some significant shortness of breath which he originally attributed to his asthma, but noted his symptoms were worsened by trying his home albuterol inhaler. Denied palpitations, dizziness or lightheadedness, syncope or presyncopal events, diaphoresis. No prior history of similar events. After getting back to his house, his symptoms persisted and he was lying on his couch unable to do much because of the pain; his family called his mom who has unfortunately suffered 5 prior heart attacks, and due to his symptoms they recommended he go to the emergency department. Patient does also admit to some significant personal stressors in regards to a family member getting into a car accident and him being notified of that family member being in the ICU in [place] during this time, so he was concerned this may  have been contributing somewhat as well. His brother eventually drove him to the emergency department. On presentation to the emergency department: ECG notable for inferior STEMI s/p PCI to RCA (PDA) (09/02/2024) with Dr. Briones. Medicine was consulted for medical management i/s/o other chronic comorbidities. He does admit to a very significant family history of premature CAD as noted above.     Patient seen and examined, he is feeling much better and reports total resolve of his symptoms. He is in good spirits and joking around; has an appetite and is attempting to get some food down now that he is back from the cath lab. He overall has no questions or concerns at this time; no current chest pain/pressure or anginal symptoms, shortness of breath, palpitations, dizziness or lightheadedness, headache, vision changes, neck pain or stiffness, f/c/n/v/d/abd pain, focal and/or lateralizing sensory or motor deficits. He is aware of the hospitalist presence in house overnight if he has any changes in symptoms, questions, or concerns.     He does have a regular PCP who is located in [place], [place]; regularly follows with them since moving to [place] about a year ago. He does get blood work regularly and reports that he was told his cholesterol was overall good not too long ago, and that his most recent A1c within the last 3 months was improved to ~6.3% from 11% when he was diagnosed the end of last year / beginning of this year. He is okay with repeating any lab work necessary at this time if things do not show up in our system.     Review of Systems:   I performed a complete 12 point review of systems and it is negative except as noted in HPI or above. All other systems have been reviewed and are negative.    PAST HISTORIES:     Past Medical History:  He has a past medical history of Asthma (HHS-HCC) and HTN (hypertension).    Past Surgical History:  He has no past surgical history on file.      Social History:  He reports  "that he has been smoking cigarettes. He has never used smokeless tobacco. He reports that he does not drink alcohol and does not use drugs.    Family History:  Significant family hx of premature CAD in 30s-40s; younger brother - MI @ 32; mother - MI x 5, starting in 30s; grandparents - before 50      Allergies:  Patient has no known allergies.    OBJECTIVE:     Last Recorded Vitals:  Vitals:    09/02/24 1158 09/02/24 1201 09/02/24 1206 09/02/24 1215   BP: (!) 167/96 (!) 125/94 (!) 147/93 142/85   Pulse: 90 93 87 90   Resp: 16 20 (!) 8 11   Temp:       TempSrc:       SpO2: 97% 98% 94% 96%   Weight:       Height:         /85   Pulse 90   Temp 37 °C (98.6 °F) (Skin)   Resp 11   Ht 1.676 m (5' 6\")   Wt 89.6 kg (197 lb 8.5 oz)   SpO2 96%   BMI 31.88 kg/m²      Physical Exam:  Vital signs and nursing notes reviewed.   Constitutional: Pleasant and cooperative. Laying in bed in no acute distress. Conversant.   Skin: Warm and dry; no obvious lesions, rashes, pallor, or jaundice. Good turgor. RUE - band in place - site well-appearing.  Eyes: EOMI. Anicteric sclera. Glasses in place.   ENT: Mucous membranes moist; no obvious injury or deformity appreciated.   Head and Neck: Normocephalic, atraumatic. ROM preserved. Trachea midline. No appreciable JVD.   Respiratory: Nonlabored on RA. Lungs clear to auscultation bilaterally without obvious adventitious sounds. Chest rise is equal.  Cardiovascular: Sinus rhythm with HR in the low 90s on tele during exam. No gross murmur, gallop, or rub. Extremities are warm and well-perfused with good capillary refill (< 3 seconds). No chest wall tenderness.   GI: Abdomen soft, nontender, nondistended. No obvious organomegaly appreciated. Bowel sounds are present and normoactive.  : No CVA tenderness.   MSK: No gross abnormalities appreciated. No limitations to AROM/PROM appreciated.   Extremities: RUE - band in place - site well-appearing. No cyanosis, edema, or clubbing evident. " Neurovascularly intact.   Neuro: A&Ox3. CN 2-12 grossly intact. Able to respond to questions appropriately and clearly. No acute focal neurologic deficits appreciated.  Psych: Appropriate mood and behavior.    Inpatient Medications:  [START ON 9/3/2024] aspirin, 81 mg, oral, Daily  insulin regular, 0-5 Units, subcutaneous, TID  metoprolol tartrate, 25 mg, oral, BID  ticagrelor, 90 mg, oral, BID      PRN medications: adenosine (Adenocard) 5 mcg, nitroglycerin (Tridil) 500 mcg in sodium chloride 0.9% 45 mL injection, fentaNYL PF, heparin, iohexol, lidocaine, midazolam, midazolam, nitroglycerin (Tridil) 500 mcg, verapamil (Isoptin) 5 mg in sodium chloride 0.9% 45 mL injection, nitroglycerin, sodium chloride, sodium chloride    Outpatient Medications:  Prior to Admission medications    Medication Sig Start Date End Date Taking? Authorizing Provider   albuterol 90 mcg/actuation inhaler Inhale 2 puffs every 6 hours if needed. 8/15/24  Yes Historical Provider, MD   atorvastatin (Lipitor) 40 mg tablet Take 1 tablet (40 mg) by mouth early in the morning.. 7/18/24  Yes Historical Provider, MD   gabapentin (Neurontin) 600 mg tablet Take 1 tablet (600 mg) by mouth 3 times a day. 8/14/24  Yes Historical Provider, MD   lisinopril 10 mg tablet Take 1 tablet (10 mg) by mouth early in the morning.. 7/18/24  Yes Historical Provider, MD   magnesium oxide (Mag-Ox) 400 mg (241.3 mg magnesium) tablet Take 1 tablet (400 mg) by mouth 1 time if needed. Asneeded before bed for leg cramps 8/13/24  Yes Historical Provider, MD   Trulicity 0.75 mg/0.5 mL pen injector Inject 0.75 mg under the skin 1 (one) time per week. 2/5/24  Yes Historical Provider, MD       LABS AND IMAGING:     Labs:  Recent labs reviewed in the EMR.    Results from last 7 days   Lab Units 09/02/24  1144   WBC AUTO x10*3/uL 15.2*   HEMOGLOBIN g/dL 19.2*   HEMATOCRIT % 56.0*   PLATELETS AUTO x10*3/uL 248      Results from last 7 days   Lab Units 09/02/24  1144   SODIUM mmol/L  132*   POTASSIUM mmol/L 3.8   CHLORIDE mmol/L 101   CO2 mmol/L 20*   BUN mg/dL 17   CREATININE mg/dL 1.36*   GLUCOSE mg/dL 154*   CALCIUM mg/dL 9.7      Results from last 7 days   Lab Units 09/02/24  1144   SODIUM mmol/L 132*   POTASSIUM mmol/L 3.8   CHLORIDE mmol/L 101   CO2 mmol/L 20*   BUN mg/dL 17   CREATININE mg/dL 1.36*   CALCIUM mg/dL 9.7   PROTEIN TOTAL g/dL 7.3   BILIRUBIN TOTAL mg/dL 0.6   ALK PHOS U/L 75   ALT U/L 25   AST U/L 25   GLUCOSE mg/dL 154*             Imaging:  No results found.

## 2024-09-02 NOTE — Clinical Note
Closure device placed in the right radial artery. Site closed by radial compression system. 12cc Air

## 2024-09-02 NOTE — POST-PROCEDURE NOTE
Physician Transition of Care Summary  Invasive Cardiovascular Lab    Procedure Date: 9/2/2024  Attending:    * Nathanael Briones - Primary  Resident/Fellow/Other Assistant: Surgeons and Role:  * No surgeons found with a matching role *    Indications:   Pre-op Diagnosis      * STEMI (ST elevation myocardial infarction) (Multi) [I21.3]    Post-procedure diagnosis:   Post-op Diagnosis     * STEMI (ST elevation myocardial infarction) (Multi) [I21.3]    Procedure(s):   Left Heart Cath, No LV  11142 - [place] L HRT CATH W/NJX L VENTRICULOGRAPHY IMG S&I    PCI  42594 - [place] PRQ TRLUML CORONARY ANGIOPLASTY ONE ART/BRANCH    IVUS - Coronary  86285 - [place] ENDOLUMINAL CORONARY IVUS OCT I&R INITIAL VESSEL        Procedure Findings:   90% PDA    Description of the Procedure:   Left heart cath, PCI of PDA    Complications:   None    Stents/Implants:   Implants       Stent    Stent, Barrington Kingman Noah, 2.50 X 22rx - [id number] - Implanted        Inventory item: STENT, BARRINGTON FRONTIER NOAH, 2.50 X 22RX Model/Cat number: [id number]    Serial number: [id number]^2001 : [employer]    Lot number: [id number] Device identifier: [id number]      As of 9/2/2024       Status: Implanted                              Anticoagulation/Antiplatelet Plan:   Heparin    Estimated Blood Loss:   * No values recorded between 9/2/2024 12:18 PM and 9/2/2024  1:02 PM *    Anesthesia: Moderate Sedation Anesthesia Staff: No anesthesia staff entered.    Any Specimen(s) Removed:   No specimens collected during this procedure.    Disposition:   ICU      Electronically signed by: Nathanael Briones MD, 9/2/2024 1:02 PM

## 2024-09-02 NOTE — H&P
"History Of Present Illness:    Murali Love is a 44 y.o. male with Hx of Type 2 DM, HTN, presents with C/O chest pain which started 1 hr prior to arrival. He denied any palpitations or SOB. No syncope. ECG with inferior ST elevation.      Last Recorded Vitals:  Vitals:    09/02/24 1158 09/02/24 1201 09/02/24 1206 09/02/24 1215   BP: (!) 167/96 (!) 125/94 (!) 147/93 142/85   Pulse: 90 93 87 90   Resp: 16 20 (!) 8 11   Temp:       TempSrc:       SpO2: 97% 98% 94% 96%   Weight:       Height:           Last Labs:  CBC - 9/2/2024: 11:44 AM  15.2 19.2 248    56.0      CMP - 9/2/2024: 11:44 AM  9.7 7.3 25 --- 0.6   _ 4.6 25 75      PTT - No results in last year.  _   _ _     Troponin I, High Sensitivity   Date/Time Value Ref Range Status   09/02/2024 11:44 AM 9 0 - 20 ng/L Final      Last I/O:  No intake/output data recorded.    Past Cardiology Tests (Last 3 Years):  EKG:  No results found for this or any previous visit from the past 1095 days.    Echo:  No results found for this or any previous visit from the past 1095 days.    Ejection Fractions:  No results found for: \"EF\"  Cath:  No results found for this or any previous visit from the past 1095 days.    Stress Test:  No results found for this or any previous visit from the past 1095 days.    Cardiac Imaging:  No results found for this or any previous visit from the past 1095 days.      Past Medical History:  He has a past medical history of Asthma (HHS-HCC) and HTN (hypertension).    Past Surgical History:  He has no past surgical history on file.      Social History:  He reports that he has been smoking cigarettes. He has never used smokeless tobacco. He reports that he does not drink alcohol and does not use drugs.    Family History:  No family history on file.     Allergies:  Patient has no known allergies.    Inpatient Medications:  Scheduled medications   Medication Dose Route Frequency    [START ON 9/3/2024] aspirin  81 mg oral Daily    metoprolol tartrate  25 " mg oral BID    ticagrelor  90 mg oral BID     PRN medications   Medication    adenosine (Adenocard) 5 mcg, nitroglycerin (Tridil) 500 mcg in sodium chloride 0.9% 45 mL injection    fentaNYL PF    heparin    iohexol    lidocaine    midazolam    midazolam    nitroglycerin (Tridil) 500 mcg, verapamil (Isoptin) 5 mg in sodium chloride 0.9% 45 mL injection    nitroglycerin    sodium chloride    sodium chloride     Continuous Medications   Medication Dose Last Rate    sodium chloride   250 mL (09/02/24 1222)    sodium chloride        sodium chloride 0.9%  100 mL/hr       Outpatient Medications:  Current Outpatient Medications   Medication Instructions    albuterol 90 mcg/actuation inhaler 2 puffs, inhalation, Every 6 hours PRN    atorvastatin (Lipitor) 40 mg tablet 1 tablet, oral, Daily (0630)    gabapentin (Neurontin) 600 mg tablet 1 tablet, oral, 3 times daily (0900,1400,1900)    lisinopril 10 mg tablet 1 tablet, oral, Daily (0630)    magnesium oxide (MAG-OX) 400 mg, oral, Once PRN Procedure, Asneeded before bed for leg cramps    Trulicity 0.75 mg, subcutaneous, Once Weekly       Physical Exam:  Constitutional:       Appearance: Not in distress.   Eyes:      Pupils: Pupils are equal, round, and reactive to light.   HENT:    Mouth/Throat:      Pharynx: Oropharynx is clear.   Pulmonary:      Effort: Pulmonary effort is normal.   Cardiovascular:      PMI at left midclavicular line. Normal rate. Regular rhythm. S1 with normal intensity. S2 with normal intensity.    Abdominal:      General: Bowel sounds are normal.   Musculoskeletal: Normal range of motion.      Cervical back: Normal range of motion and neck supple. Skin:     General: Skin is warm.   Neurological:      General: No focal deficit present.           Assessment/Plan   1) Inferior STEMI  S/P PCI of RCA (PDA)  DAPT and statins  Echo  Metoprolol  2) Type 2 DM  IMS to see    Peripheral IV 09/02/24 18 G Distal;Left;Upper Antecubital (Active)   Site Assessment  Clean;Dry;Intact 09/02/24 1142   Dressing Type Tape;Transparent 09/02/24 1142   Line Status Blood return noted;Lab draw;Flushed;Saline locked 09/02/24 1142   Dressing Status Clean;Dry 09/02/24 1142   Number of days: 0       Code Status:  No Order    I spent 45 minutes of Critical Care time in the professional and overall care of this acutely sick patient with acute inferior STEMI.        Nathanael Briones MD

## 2024-09-03 ENCOUNTER — PHARMACY VISIT (OUTPATIENT)
Dept: PHARMACY | Facility: CLINIC | Age: 44
End: 2024-09-03
Payer: MEDICAID

## 2024-09-03 ENCOUNTER — APPOINTMENT (OUTPATIENT)
Dept: CARDIOLOGY | Facility: HOSPITAL | Age: 44
End: 2024-09-03
Payer: COMMERCIAL

## 2024-09-03 ENCOUNTER — TELEPHONE (OUTPATIENT)
Dept: CARDIOLOGY | Facility: HOSPITAL | Age: 44
End: 2024-09-03

## 2024-09-03 ENCOUNTER — TELEPHONE (OUTPATIENT)
Dept: CARDIOLOGY | Facility: HOSPITAL | Age: 44
End: 2024-09-03
Payer: COMMERCIAL

## 2024-09-03 VITALS
HEIGHT: 66 IN | BODY MASS INDEX: 31.75 KG/M2 | SYSTOLIC BLOOD PRESSURE: 112 MMHG | WEIGHT: 197.53 LBS | RESPIRATION RATE: 10 BRPM | TEMPERATURE: 98.1 F | DIASTOLIC BLOOD PRESSURE: 86 MMHG | HEART RATE: 89 BPM | OXYGEN SATURATION: 93 %

## 2024-09-03 PROBLEM — F17.200 TOBACCO USE DISORDER: Chronic | Status: ACTIVE | Noted: 2024-09-03

## 2024-09-03 PROBLEM — I25.10 ASHD (ARTERIOSCLEROTIC HEART DISEASE): Status: ACTIVE | Noted: 2024-09-03

## 2024-09-03 PROBLEM — I21.3 ST ELEVATION MYOCARDIAL INFARCTION (STEMI), UNSPECIFIED ARTERY (MULTI): Status: RESOLVED | Noted: 2024-09-02 | Resolved: 2024-09-03

## 2024-09-03 LAB
ANION GAP SERPL CALC-SCNC: 8 MMOL/L (ref 10–20)
AORTIC VALVE MEAN GRADIENT: 6 MMHG
AORTIC VALVE PEAK VELOCITY: 1.67 M/S
ATRIAL RATE: 88 BPM
AV PEAK GRADIENT: 11.2 MMHG
AVA (PEAK VEL): 2.46 CM2
AVA (VTI): 2.42 CM2
BUN SERPL-MCNC: 17 MG/DL (ref 6–23)
CALCIUM SERPL-MCNC: 8.6 MG/DL (ref 8.6–10.3)
CHLORIDE SERPL-SCNC: 107 MMOL/L (ref 98–107)
CHOLEST SERPL-MCNC: 118 MG/DL (ref 0–199)
CHOLESTEROL/HDL RATIO: 4.3
CO2 SERPL-SCNC: 26 MMOL/L (ref 21–32)
CREAT SERPL-MCNC: 1.25 MG/DL (ref 0.5–1.3)
EGFRCR SERPLBLD CKD-EPI 2021: 72 ML/MIN/1.73M*2
EJECTION FRACTION APICAL 4 CHAMBER: 55.5
EJECTION FRACTION: 57 %
ERYTHROCYTE [DISTWIDTH] IN BLOOD BY AUTOMATED COUNT: 14.5 % (ref 11.5–14.5)
EST. AVERAGE GLUCOSE BLD GHB EST-MCNC: 166 MG/DL
GLUCOSE BLD MANUAL STRIP-MCNC: 194 MG/DL (ref 74–99)
GLUCOSE SERPL-MCNC: 180 MG/DL (ref 74–99)
HBA1C MFR BLD: 7.4 %
HCT VFR BLD AUTO: 49.2 % (ref 41–52)
HDLC SERPL-MCNC: 27.5 MG/DL
HGB BLD-MCNC: 16.6 G/DL (ref 13.5–17.5)
LDLC SERPL CALC-MCNC: 48 MG/DL
LEFT ATRIUM VOLUME AREA LENGTH INDEX BSA: 118.8 ML/M2
LEFT VENTRICLE INTERNAL DIMENSION DIASTOLE: 4.5 CM (ref 3.5–6)
LEFT VENTRICULAR OUTFLOW TRACT DIAMETER: 2 CM
LV EJECTION FRACTION BIPLANE: 57 %
MCH RBC QN AUTO: 28.6 PG (ref 26–34)
MCHC RBC AUTO-ENTMCNC: 33.7 G/DL (ref 32–36)
MCV RBC AUTO: 85 FL (ref 80–100)
MITRAL VALVE E/A RATIO: 1.08
NON HDL CHOLESTEROL: 91 MG/DL (ref 0–149)
NRBC BLD-RTO: 0 /100 WBCS (ref 0–0)
P AXIS: 5 DEGREES
P OFFSET: 183 MS
P ONSET: 131 MS
PLATELET # BLD AUTO: 192 X10*3/UL (ref 150–450)
POTASSIUM SERPL-SCNC: 3.8 MMOL/L (ref 3.5–5.3)
PR INTERVAL: 144 MS
Q ONSET: 203 MS
QRS COUNT: 15 BEATS
QRS DURATION: 104 MS
QT INTERVAL: 372 MS
QTC CALCULATION(BAZETT): 450 MS
QTC FREDERICIA: 422 MS
R AXIS: 77 DEGREES
RBC # BLD AUTO: 5.8 X10*6/UL (ref 4.5–5.9)
RIGHT VENTRICLE FREE WALL PEAK S': 11.7 CM/S
SODIUM SERPL-SCNC: 137 MMOL/L (ref 136–145)
T AXIS: 23 DEGREES
T OFFSET: 389 MS
TRICUSPID ANNULAR PLANE SYSTOLIC EXCURSION: 2.5 CM
TRIGL SERPL-MCNC: 214 MG/DL (ref 0–149)
VENTRICULAR RATE: 88 BPM
VLDL: 43 MG/DL (ref 0–40)
WBC # BLD AUTO: 13.5 X10*3/UL (ref 4.4–11.3)

## 2024-09-03 PROCEDURE — 85027 COMPLETE CBC AUTOMATED: CPT | Performed by: INTERNAL MEDICINE

## 2024-09-03 PROCEDURE — 37799 UNLISTED PX VASCULAR SURGERY: CPT | Performed by: INTERNAL MEDICINE

## 2024-09-03 PROCEDURE — 82947 ASSAY GLUCOSE BLOOD QUANT: CPT

## 2024-09-03 PROCEDURE — 99239 HOSP IP/OBS DSCHRG MGMT >30: CPT | Performed by: NURSE PRACTITIONER

## 2024-09-03 PROCEDURE — 93306 TTE W/DOPPLER COMPLETE: CPT

## 2024-09-03 PROCEDURE — 2500000001 HC RX 250 WO HCPCS SELF ADMINISTERED DRUGS (ALT 637 FOR MEDICARE OP): Performed by: INTERNAL MEDICINE

## 2024-09-03 PROCEDURE — 99233 SBSQ HOSP IP/OBS HIGH 50: CPT | Performed by: PHYSICIAN ASSISTANT

## 2024-09-03 PROCEDURE — 80061 LIPID PANEL: CPT | Performed by: STUDENT IN AN ORGANIZED HEALTH CARE EDUCATION/TRAINING PROGRAM

## 2024-09-03 PROCEDURE — RXMED WILLOW AMBULATORY MEDICATION CHARGE

## 2024-09-03 PROCEDURE — 82374 ASSAY BLOOD CARBON DIOXIDE: CPT | Performed by: INTERNAL MEDICINE

## 2024-09-03 PROCEDURE — 2500000004 HC RX 250 GENERAL PHARMACY W/ HCPCS (ALT 636 FOR OP/ED): Performed by: INTERNAL MEDICINE

## 2024-09-03 PROCEDURE — 93306 TTE W/DOPPLER COMPLETE: CPT | Performed by: INTERNAL MEDICINE

## 2024-09-03 PROCEDURE — 2500000002 HC RX 250 W HCPCS SELF ADMINISTERED DRUGS (ALT 637 FOR MEDICARE OP, ALT 636 FOR OP/ED): Performed by: INTERNAL MEDICINE

## 2024-09-03 RX ORDER — INSULIN GLARGINE 100 [IU]/ML
INJECTION, SOLUTION SUBCUTANEOUS
COMMUNITY
Start: 2024-01-10

## 2024-09-03 RX ORDER — METOPROLOL TARTRATE 25 MG/1
25 TABLET, FILM COATED ORAL 2 TIMES DAILY
Qty: 60 TABLET | Refills: 1 | Status: SHIPPED | OUTPATIENT
Start: 2024-09-03

## 2024-09-03 RX ORDER — LANCETS 30 GAUGE
EACH MISCELLANEOUS
COMMUNITY
Start: 2024-06-13

## 2024-09-03 RX ORDER — PEN NEEDLE, DIABETIC 32GX 5/32"
NEEDLE, DISPOSABLE MISCELLANEOUS
COMMUNITY
Start: 2024-01-05

## 2024-09-03 RX ORDER — ATORVASTATIN CALCIUM 80 MG/1
80 TABLET, FILM COATED ORAL NIGHTLY
Qty: 30 TABLET | Refills: 1 | Status: SHIPPED | OUTPATIENT
Start: 2024-09-03

## 2024-09-03 RX ORDER — IBUPROFEN 200 MG
1 TABLET ORAL DAILY
Qty: 28 PATCH | Refills: 0 | Status: SHIPPED | OUTPATIENT
Start: 2024-09-04

## 2024-09-03 RX ORDER — MELOXICAM 15 MG/1
1 TABLET ORAL
COMMUNITY
Start: 2024-07-18

## 2024-09-03 RX ORDER — DICLOFENAC SODIUM 50 MG/1
TABLET, DELAYED RELEASE ORAL
COMMUNITY
Start: 2024-08-13

## 2024-09-03 RX ORDER — BLOOD-GLUCOSE SENSOR
EACH MISCELLANEOUS
COMMUNITY
Start: 2024-08-12

## 2024-09-03 RX ORDER — CALCIUM CITRATE/VITAMIN D3 200MG-6.25
TABLET ORAL
COMMUNITY
Start: 2024-06-13

## 2024-09-03 RX ORDER — BLOOD-GLUCOSE METER
EACH MISCELLANEOUS
COMMUNITY
Start: 2024-06-13

## 2024-09-03 RX ORDER — ASPIRIN 81 MG/1
81 TABLET ORAL DAILY
Qty: 30 TABLET | Refills: 1 | Status: SHIPPED | OUTPATIENT
Start: 2024-09-04

## 2024-09-03 RX ORDER — DULAGLUTIDE 3 MG/.5ML
INJECTION, SOLUTION SUBCUTANEOUS
COMMUNITY
Start: 2024-08-12

## 2024-09-03 ASSESSMENT — ENCOUNTER SYMPTOMS
FEVER: 0
BLURRED VISION: 0
BLOOD IN STOOL: 0
EYE PAIN: 0
DIARRHEA: 0
FLANK PAIN: 0
RESPIRATORY NEGATIVE: 1
APPETITE CHANGE: 0
ORTHOPNEA: 0
FALLS: 0
PALPITATIONS: 0
DEPRESSION: 0
SORE THROAT: 0
NUMBNESS: 0
ABDOMINAL PAIN: 0
SHORTNESS OF BREATH: 0
DYSURIA: 0
FOCAL WEAKNESS: 0
DECREASED APPETITE: 0
CHEST TIGHTNESS: 0
CONSTITUTIONAL NEGATIVE: 1
IRREGULAR HEARTBEAT: 0
TROUBLE SWALLOWING: 0
LIGHT-HEADEDNESS: 0
DYSPNEA ON EXERTION: 0
MEMORY LOSS: 0
NAUSEA: 0
FATIGUE: 0
CONSTIPATION: 0
WEAKNESS: 0
BACK PAIN: 0
FACIAL SWELLING: 0
HEMATURIA: 0
DIZZINESS: 0
COUGH: 0
HEADACHES: 0
WHEEZING: 0
BRUISES/BLEEDS EASILY: 0
GASTROINTESTINAL NEGATIVE: 1
SYNCOPE: 0
WOUND: 0
JOINT SWELLING: 0
DIAPHORESIS: 0
HALLUCINATIONS: 0
CHILLS: 0
VOMITING: 0
FREQUENCY: 0

## 2024-09-03 ASSESSMENT — COGNITIVE AND FUNCTIONAL STATUS - GENERAL
DAILY ACTIVITIY SCORE: 24
MOBILITY SCORE: 24

## 2024-09-03 ASSESSMENT — PAIN - FUNCTIONAL ASSESSMENT: PAIN_FUNCTIONAL_ASSESSMENT: 0-10

## 2024-09-03 ASSESSMENT — PAIN SCALES - GENERAL
PAINLEVEL_OUTOF10: 0 - NO PAIN

## 2024-09-03 NOTE — TELEPHONE ENCOUNTER
Transitional Care Management Note      Discharge Facility: [facility]  Discharge Diagnosis: Stemi/ASHD  Admission Date: 09/02/2024  Discharge Date: 09/03/2024  Discharge Physician: Mary Ann Wilburn CNP     PCP Appointment Date: 9/13  Specialist Appointment Date (must be with in 14 days of discharge): 9/9/24 Dr. Briones  Hospital Encounter and Summary Reviewed: Yes    Engagement  Initial Post-Discharge Communication (or 2 attempts) with in 2 business days post D/C  Call Start Time: 1544 09/03/2024     Medications  Medications reviewed with patient/caregiver?: Yes  Is the patient having any side effects they believe may be caused by any medication additions or changes?: No  Does the patient have all medications ordered at discharge?: Yes  Is the patient taking all medications as directed (includes completed medication regime)?: Yes  Medication Comments:      Appointments  Does the patient have a primary care provider?: Yes  Care Management Interventions: N/A     Self Management  What is the home health agency?: N/A  What Durable Medical Equipment (DME) was ordered?: N/A  Is patient independent with activities of daily living?Yes  If not, are they getting the assistance they need?No     Patient Teaching  Any restrictions on discharge? Yes- no heavy lefting for a couple weeks.  Assessment of any procedure sites or wounds: clean, dry, intact, no bleeding or oozing, no hematoma noted.  What is the patient's perception of their health status since discharge?: Pt is feeling better since leaving the hospital. Knows the importance of taking his medications and staying on them.     Current Outpatient Medications   Medication Instructions    albuterol 90 mcg/actuation inhaler 2 puffs, inhalation, Every 6 hours PRN    [START ON 9/4/2024] aspirin 81 mg, oral, Daily    atorvastatin (LIPITOR) 80 mg, oral, Nightly    Brilinta 90 mg, oral, 2 times daily    gabapentin (Neurontin) 600 mg tablet 1 tablet, oral, 3 times daily  (0900,1400,1900)    lisinopril 10 mg tablet 1 tablet, oral, Daily (0630)    magnesium oxide (MAG-OX) 400 mg, oral, Once PRN Procedure, Asneeded before bed for leg cramps    metoprolol tartrate (LOPRESSOR) 25 mg, oral, 2 times daily    [START ON 9/4/2024] nicotine (Nicoderm CQ) 14 mg/24 hr patch 1 patch, transdermal, Daily    Trulicity 0.75 mg, subcutaneous, Once Weekly        No Known Allergies       RN called no answer, left message. Will try again later.    Transitional Care Management Note    Discharge Facility: [facility]  Discharge Diagnosis: Stemi/ASHD  Admission Date: 09/02/2024  Discharge Date: 09/03/2024  Discharge Physician: Mary Ann Wilburn CNP     PCP Appointment Date:   Specialist Appointment Date (must be with in 14 days of discharge): 9/9/24 Dr. Briones  [facility] Encounter and Summary Reviewed: Yes

## 2024-09-03 NOTE — PROGRESS NOTES
Murali Love is a 44 y.o. male on day 1 of admission presenting with ASHD (arteriosclerotic heart disease).      Subjective   Murali Love is a 44 y.o. male with past medical history s/f HTN, DLD, T2DM c/b peripheral neuropathy, asthma, tobacco use disorder, who presented with midsternal chest pain which began within an hour prior to arrival to the ED. Was found to have inferior STEMI s/p PCI to RCA (PDA) (09/02/2024) with Dr. Briones. Lipid panel: LDL 48, VLDL and triglycerides elevated. A1c 7.4%    9/3/2024: No acute events overnight. Vitals stable, BP better controlled 111/73. Glucose 180 this morning. Leukocytosis is improving, thought to be reactive. Patient eager for dc to home today         Review of Systems   Constitutional:  Negative for appetite change, chills, diaphoresis, fatigue and fever.   HENT:  Negative for congestion, ear pain, facial swelling, hearing loss, nosebleeds, sore throat, tinnitus and trouble swallowing.    Eyes:  Negative for pain.   Respiratory:  Negative for cough, chest tightness, shortness of breath and wheezing.    Cardiovascular:  Negative for chest pain, palpitations and leg swelling.   Gastrointestinal:  Negative for abdominal pain, blood in stool, constipation, diarrhea, nausea and vomiting.   Genitourinary:  Negative for dysuria, flank pain, frequency, hematuria and urgency.   Musculoskeletal:  Negative for back pain and joint swelling.   Skin:  Negative for rash and wound.   Neurological:  Negative for dizziness, syncope, weakness, light-headedness, numbness and headaches.   Hematological:  Does not bruise/bleed easily.   Psychiatric/Behavioral:  Negative for behavioral problems, hallucinations and suicidal ideas.           Objective     Last Recorded Vitals  /62   Pulse 87   Temp 36.7 °C (98.1 °F) (Temporal)   Resp 12   Wt 89.6 kg (197 lb 8.5 oz)   SpO2 95%     Image Results  Cardiac Catheterization Procedure    Result Date: 9/2/2024       [facility]  [place], Cath Lab [address] [place] [address]    Phone [phone] Fax [phone] Cardiovascular Catheterization Report Patient Name:      SHIRA THORPE      Performing Physician:  Yandy Briones MD Study Date:        9/2/2024            Verifying Physician:   Yandy Briones MD MRN/PID:           [id number]            Cardiologist/Co-Scrub: Accession#:        [id number]        Ordering Provider:     Yandy BRIONES Date of Birth/Age: 1980 / 44      Cardiologist:                    years Gender:            M                   Fellow: Encounter#:        [id number]          Surgeon:  Study:            Left Heart Cath Additional Study: PCI - Percutaneous Coronary Intervention  Indications: SHIRA THORPE is a 45 year old male who presents with dyslipidemia, diabetes and hypertension. SHIRA THORPE is a 45 year old male who presents with dyslipidemia, diabetes, hypertension and a chest pain assessment of typical angina. Acute coronary syndrome - STEMI. Acute MI. Stress test performed: No. CTA performed: NoRuby Bean accessed: No. LVEF Assessed: No. Cardiac arrest: No. Cardiac surgical consult: No. Cardiovascular Instability: No Frailty status of patient entering lab: 5 = Mildly frail.  Coronary Angiography: The coronary circulation is right dominant.  Left Main Coronary Artery: The left main coronary artery is a normal caliber vessel. The left main arises normally from the left coronary sinus of Valsalva and bifurcates into the LAD and circumflex coronary arteries. The left main coronary artery showed no significant disease or stenosis greater than 30%.  Left Anterior Descending Coronary Artery Distribution: The left anterior descending coronary artery is a normal caliber vessel. The LAD arises normally from the left main coronary artery. The LAD demonstrated mild  atherosclerotic disease.  Circumflex Coronary Artery Distribution: The circumflex coronary artery is a normal caliber vessel. The circumflex arises normally from the left main coronary artery and terminates in the AV groove. The circumflex revealed no significant disease or stenosis greater than 30%.  Right Coronary Artery Distribution: The right coronary artery is a normal caliber vessel. The RCA arises normally from the right sinus of Valsalva. The RCA showed no significant disease or stenosis greater than 30%. The right posterior descending artery is a normal caliber vessel. The right posterior descending artery showed severe atherosclerotic disease. The proximal right posterior descending artery revealed 85% stenosis.  Valve Findings: No aortic valve stenosis is visualized.  Coronary Interventions: Angiography reveals a 85% stenosis of the proximal right posterior descending artery coronary artery. Pre-intervention SIXTO flow was 0. Percutaneous coronary intervention was performed within the proximal right posterior descending artery. The vessel was pre-dilated using a compliant balloon 2.0 mm x 15 mm at 12 NEVILLE. [place] Pavan drug-eluting stent 2.5 mm x 22 mm was advanced to the lesion and implanted at 12 NEVILLE. The stent was post dilated using a non-compliant balloon 2.5 mm x 15 mm at 16 NEVILLE. Additional dilatation was performed using a non-compliant balloon 3.0 mm x 6 mm at 16 NEVILLE. The stenosis was successfully reduced from 85% to <10%. Post intervention Intravascular Ultrasound (IVUS) was performed within the proximal right posterior descending artery . The stent demonstrated good apposition. No thrombus was visualized. No edge dissection is visualized. Post-intervention SIXTO flow was 3.  Coronary Lesion Summary: Vessel   Stenosis   Vessel Segment RPDA   85% stenosis    proximal  Hemo Personnel: +---------------+---------+ Name           Duty      +---------------+---------+ Nathanael Briones MD 1  +---------------+---------+  Hemodynamic Pressures:  +----+-------------------+---------+------------+-------------+------+---------+ Site     Date Time       Phase    Systolic    Diastolic    ED  Mean mmHg                          Name       mmHg        mmHg      mmHg           +----+-------------------+---------+------------+-------------+------+---------+   AO  9/2/2024 12:30:08 AIR REST         119           80             98                      PM                                                  +----+-------------------+---------+------------+-------------+------+---------+   LV  9/2/2024 12:54:54 AIR REST         162          -15    10                               PM                                                  +----+-------------------+---------+------------+-------------+------+---------+  LVp  9/2/2024 12:54:57 AIR REST         159          -16     3                               PM                                                  +----+-------------------+---------+------------+-------------+------+---------+  AOp  9/2/2024 12:55:04 AIR REST         133           72             91                      PM                                                  +----+-------------------+---------+------------+-------------+------+---------+  Oxygen Saturation %: +-----------+------------+ Sample SiteHB (g/100ml) +-----------+------------+     SYS ART        19.2 +-----------+------------+     SYS CHRISTI        19.2 +-----------+------------+     PUL ART        19.2 +-----------+------------+     PUL CHRISTI        19.2 +-----------+------------+  Complications: No in-lab complications observed.  Cardiac Cath Post Procedure Notes: Post Procedure           RADHA of RCA. Diagnosis: Blood Loss:              Estimated blood loss during the procedure was <10 ml                          mls. Specimens Removed:       Number of  specimen(s) removed: none.  Recommendations: Maximize medical therapy. Agressive risk factor modification efforts. Consider referral to cardiac rehabilitation. ____________________________________________________________________________________ CONCLUSIONS:  1. Single vessel disease.  2. The right posterior descending artery showed severe atherosclerotic disease.  3. Culprit vessel(s): right posterior descending artery.  4. Successful PCI of PDA.  5. Left Ventricular end-diastolic pressure = 10.  6. Normal LV filling pressures. ICD 10 Codes: ST elevation (STEMI) myocardial infarction involving right coronary artery-I21.11  CPT Codes: Left Heart Cath (visualization of coronaries) and LV-24811; Revasc during AMI stent/angio/atherc,ins asp Thrombectomy, Right Coronary single Vessel (PCI)-36505.RC; IVUS, Right Coronary initial Vessel (PCI)-06685.RC; Moderate Sedation Services initial 15 minutes patient >5 years-04715; Moderate Sedation Services 1st additional 15 minutes patient >5 years-45513; Moderate Sedation Services 2nd additional 15 minutes patient >5 years-78843  [id number] Nathanael Briones MD Performing Physician Electronically signed by [id number] Nathanael Briones MD on 9/2/2024 at 5:34:36 PM  ** Final **        Lab Results  Results for orders placed or performed during the hospital encounter of 09/02/24 (from the past 24 hour(s))   Troponin I, High Sensitivity   Result Value Ref Range    Troponin I, High Sensitivity 9 0 - 20 ng/L   Comprehensive Metabolic Panel   Result Value Ref Range    Glucose 154 (H) 74 - 99 mg/dL    Sodium 132 (L) 136 - 145 mmol/L    Potassium 3.8 3.5 - 5.3 mmol/L    Chloride 101 98 - 107 mmol/L    Bicarbonate 20 (L) 21 - 32 mmol/L    Anion Gap 15 10 - 20 mmol/L    Urea Nitrogen 17 6 - 23 mg/dL    Creatinine 1.36 (H) 0.50 - 1.30 mg/dL    eGFR 65 >60 mL/min/1.73m*2    Calcium 9.7 8.6 - 10.3 mg/dL    Albumin 4.6 3.4 - 5.0 g/dL    Alkaline Phosphatase 75 33 - 120 U/L    Total Protein 7.3 6.4 - 8.2 g/dL    AST 25  9 - 39 U/L    Bilirubin, Total 0.6 0.0 - 1.2 mg/dL    ALT 25 10 - 52 U/L   CBC and Auto Differential   Result Value Ref Range    WBC 15.2 (H) 4.4 - 11.3 x10*3/uL    nRBC 0.0 0.0 - 0.0 /100 WBCs    RBC 6.80 (H) 4.50 - 5.90 x10*6/uL    Hemoglobin 19.2 (H) 13.5 - 17.5 g/dL    Hematocrit 56.0 (H) 41.0 - 52.0 %    MCV 82 80 - 100 fL    MCH 28.2 26.0 - 34.0 pg    MCHC 34.3 32.0 - 36.0 g/dL    RDW 15.4 (H) 11.5 - 14.5 %    Platelets 248 150 - 450 x10*3/uL    Neutrophils % 57.9 40.0 - 80.0 %    Immature Granulocytes %, Automated 0.3 0.0 - 0.9 %    Lymphocytes % 31.6 13.0 - 44.0 %    Monocytes % 7.8 2.0 - 10.0 %    Eosinophils % 1.6 0.0 - 6.0 %    Basophils % 0.8 0.0 - 2.0 %    Neutrophils Absolute 8.83 (H) 1.20 - 7.70 x10*3/uL    Immature Granulocytes Absolute, Automated 0.04 0.00 - 0.70 x10*3/uL    Lymphocytes Absolute 4.82 (H) 1.20 - 4.80 x10*3/uL    Monocytes Absolute 1.19 (H) 0.10 - 1.00 x10*3/uL    Eosinophils Absolute 0.24 0.00 - 0.70 x10*3/uL    Basophils Absolute 0.12 (H) 0.00 - 0.10 x10*3/uL   Hemoglobin A1c   Result Value Ref Range    Hemoglobin A1C 7.4 (H) see below %    Estimated Average Glucose 166 Not Established mg/dL   ACTIVATED CLOTTING TIME LOW   Result Value Ref Range    POCT Activated Clotting Time Low Range 187 83 - 199 sec   ACTIVATED CLOTTING TIME LOW   Result Value Ref Range    POCT Activated Clotting Time Low Range     Light Blue Top   Result Value Ref Range    Extra Tube Hold for add-ons.    POCT GLUCOSE   Result Value Ref Range    POCT Glucose 164 (H) 74 - 99 mg/dL   Lipid Panel   Result Value Ref Range    Cholesterol 118 0 - 199 mg/dL    HDL-Cholesterol 27.5 mg/dL    Cholesterol/HDL Ratio 4.3     LDL Calculated 48 <=99 mg/dL    VLDL 43 (H) 0 - 40 mg/dL    Triglycerides 214 (H) 0 - 149 mg/dL    Non HDL Cholesterol 91 0 - 149 mg/dL   CBC   Result Value Ref Range    WBC 13.5 (H) 4.4 - 11.3 x10*3/uL    nRBC 0.0 0.0 - 0.0 /100 WBCs    RBC 5.80 4.50 - 5.90 x10*6/uL    Hemoglobin 16.6 13.5 - 17.5 g/dL     Hematocrit 49.2 41.0 - 52.0 %    MCV 85 80 - 100 fL    MCH 28.6 26.0 - 34.0 pg    MCHC 33.7 32.0 - 36.0 g/dL    RDW 14.5 11.5 - 14.5 %    Platelets 192 150 - 450 x10*3/uL   Basic Metabolic Panel   Result Value Ref Range    Glucose 180 (H) 74 - 99 mg/dL    Sodium 137 136 - 145 mmol/L    Potassium 3.8 3.5 - 5.3 mmol/L    Chloride 107 98 - 107 mmol/L    Bicarbonate 26 21 - 32 mmol/L    Anion Gap 8 (L) 10 - 20 mmol/L    Urea Nitrogen 17 6 - 23 mg/dL    Creatinine 1.25 0.50 - 1.30 mg/dL    eGFR 72 >60 mL/min/1.73m*2    Calcium 8.6 8.6 - 10.3 mg/dL   POCT GLUCOSE   Result Value Ref Range    POCT Glucose 194 (H) 74 - 99 mg/dL        Medications  Scheduled medications:  aspirin, 81 mg, oral, Daily  atorvastatin, 80 mg, oral, Nightly  gabapentin, 600 mg, oral, Daily  insulin regular, 0-5 Units, subcutaneous, TID  lisinopril, 10 mg, oral, Daily  metoprolol tartrate, 25 mg, oral, BID  nicotine, 1 patch, transdermal, Daily  ticagrelor, 90 mg, oral, BID      Continuous medications:     PRN medications:  PRN medications: albuterol     Physical Exam  Constitutional:       General: He is not in acute distress.     Appearance: Normal appearance.   HENT:      Head: Normocephalic and atraumatic.      Right Ear: External ear normal.      Left Ear: External ear normal.      Nose: Nose normal.      Mouth/Throat:      Mouth: Mucous membranes are moist.      Pharynx: Oropharynx is clear.   Eyes:      Extraocular Movements: Extraocular movements intact.      Conjunctiva/sclera: Conjunctivae normal.      Pupils: Pupils are equal, round, and reactive to light.   Cardiovascular:      Rate and Rhythm: Normal rate and regular rhythm.      Pulses: Normal pulses.      Heart sounds: Normal heart sounds.      Comments: R radial site without ecchymosis,swelling, tenderness. Dressing is C/D/I  Pulmonary:      Effort: Pulmonary effort is normal. No respiratory distress.      Breath sounds: Normal breath sounds. No wheezing, rhonchi or rales.    Abdominal:      General: Bowel sounds are normal.      Palpations: Abdomen is soft.      Tenderness: There is no abdominal tenderness. There is no right CVA tenderness, left CVA tenderness, guarding or rebound.   Musculoskeletal:         General: No swelling. Normal range of motion.      Cervical back: Normal range of motion and neck supple.   Skin:     General: Skin is warm and dry.      Capillary Refill: Capillary refill takes less than 2 seconds.      Findings: No lesion or rash.   Neurological:      General: No focal deficit present.      Mental Status: He is alert and oriented to person, place, and time. Mental status is at baseline.   Psychiatric:         Mood and Affect: Mood normal.         Behavior: Behavior normal.                  Assessment/Plan   This patient currently has cardiac telemetry ordered; if you would like to modify or discontinue the telemetry order, click here to go to the orders activity to modify/discontinue the order.  Inferior STEMI s/p PCI to RCA (PDA) on 09/02/2024 with Dr. Briones   -Patient underwent LHC (access: radial - R; site appears overall well; band still in place)  -Denies current CP or anginal symptoms, sitting in bed comfortably    -Family Hx: significant premature CAD history in 30s + 40s (younger brother - MI @ 32; mother - MI x 5, starting in 30s; grandparents - before 50)   -To be continued on ASA + Brilinta  -To be continued on Atorvastatin 80mg (increased from home 40mg nightly)   -Metoprolol 25mg BID  -To be monitored on tele and have echo obtained   -Lipid panel: LDL 48, VLDL and triglycerides elevated  -A1c 7.4%  -Majority of management at this time as per primary service/cardiology     Leukocytosis   -He denies any systemic infectious symptoms and has no evidence of obvious infectious source on examination  -No sick contacts or exposures  -Likely reactionary/stress response     Elevated serum creatinine, possible degree of CKD  -Patient reports that he was told  he was in stage III kidney failure on presentation to the emergency department yesterday, but reports no prior diagnosis that he is aware of (of kidney disease)  -Improving, needs close outpatient follow up     HTN, DLD   -Resumed on home lisinopril on admission   -Metoprolol 25mg BID added as above   -Atorva 40mg --> 80mg  -BP slightly elevated on presentation (possibly d/t stress response with above), improved, will monitor closely in ICU overnight and adjust PRN     T2DM c/b peripheral neuropathy   -Hold home Trulicity on admit (takes every Saturday)  -Has been started on SSI before meals, continue   -Accucheks, hypoglycemic protocol   -A1c 7.4%  -pt reports last known A1c ~6.3% (down from ~11% at time of dx 6 months prior)  -Continue home gabapentin      Asthma without acute exacerbation   -Continue home therapies   -Continue follow-up with PCP as scheduled      Tobacco Use Disorder   -Smoking cessation education >3 minutes provided   -NRT discussed and offered, declined need for this at this time   -Currently smoking < 0.5 ppd      Obesity (BMI 31.88)  -Patient does not meet morbid/severe criteria for obesity on admission   -Continued outpatient follow-up with PCP, healthy dietary and lifestyle changes as able       Code Status: Full Code     DVT ppx: Per primary     Please see orders for more complete plan    Thank you for involving us in the care of this very pleasant patient. We will follow along with you while they remain admitted. Please contact Hospitalist service if any clarification needed.     Madelaine Chand PA-C

## 2024-09-03 NOTE — PROGRESS NOTES
Medication Education     Medication education for Murali Love was provided to the patient and family for the following medication(s):  Metoprolol tartrate  Aspirin  Brilinta  Atorvastatin  Nicotine Patches        Medication education provided by a Pharmacist:  ADR Counseling Proper dose, indication, possible ADRs    Identified potential barriers to education:  None    Method(s) of Education:  Verbal    An opportunity to ask questions and receive answers was provided.     Assessment of understanding the patient and family:  2= meets goals/outcomes    Additional Notes (if applicable):     Kathya Barrera

## 2024-09-03 NOTE — DISCHARGE INSTRUCTIONS
Cath Lab Interventional:   Activity:  You are advised to go directly home from the hospital.    Radial Site Care:  DO NOT lift anything with your affected arm for the next 24 hours, then no lifting greater than 5 pounds with your affected arm for 5 more days. No bending or flexing the affected wrist.     Avoid immersion in water of the affected site for the next 3 days.     Apply manual pressure and call physician immediately if bleeding or hematoma occurs at the site.     Remove dressing the next day and keep site clean, dry, and covered with a new band aid daily until healed.     Report any symptoms other than slight tenderness at the site or tingling of the fingers and hand for up to 3 days.    Diet:  Heart healthy, low sodium diet.    Other Instructions:  If you develop diffficulty breathing, rash, hives, severe nausea, vomiting, light-headedness or any signs of infection, immediately contact your doctor and go to the nearest emergency room.     If your doctor has prescribed aspirin and/or clopidogrel (Plavix) or prasugrel (Effient) or ticagrelor (Brilinta) and you have a stent in your heart arteries, DO NOT STOP THESE MEDICATIONS for any reason without talking first to your cardiologist.     You must take your aspirin, clopidogrel (Plavix), prasugrel (Effient), or ticagrelor (Brilinta) every day without missing a single dose.  If you are getting low on these medications, contact your physician immediately for a refill.

## 2024-09-03 NOTE — DISCHARGE SUMMARY
CARDIOLOGY DISCHARGE SUMMARY  Discharge Diagnosis  STEMI/ASHD (arteriosclerotic heart disease)    History Of Present Illness:    Murali Love is a 44 y.o. male with Hx of Type 2 DM, HTN, presents with C/O chest pain which started 1 hr prior to arrival. He denied any palpitations or SOB. No syncope. ECG with inferior ST elevation.     Subjective Data:  Feels well, no issues overnight.  No CP/pressure/dyspnea.  Has been out of bed without lightheadedness.  Monitor: SR K 3.8, creatinine 1.25.  Echo done and not yet resulted.  Patient wants to go home today.     Last Labs:  Results from last 7 days   Lab Units 09/03/24  0423 09/02/24  1144   SODIUM mmol/L 137 132*   POTASSIUM mmol/L 3.8 3.8   CHLORIDE mmol/L 107 101   CO2 mmol/L 26 20*   BUN mg/dL 17 17   CREATININE mg/dL 1.25 1.36*   GLUCOSE mg/dL 180* 154*   CALCIUM mg/dL 8.6 9.7     Results from last 7 days   Lab Units 09/03/24  0423   WBC AUTO x10*3/uL 13.5*   HEMOGLOBIN g/dL 16.6   HEMATOCRIT % 49.2   PLATELETS AUTO x10*3/uL 192         Hospital Course   Murali Love is a 44 y.o. male with Hx of Type 2 DM, HTN, presents with C/O chest pain which started 1 hr prior to arrival. He denied any palpitations or SOB. No syncope. ECG with inferior ST elevation.   Went yesterday for urgent PCI of PDA (culprit lesion).  On DAPT, statin, BB.  Will resume home dose of ACEi.  IMS was on consult for DM management.  Patient looks stable for d/c home today.     Echo:9-3-24  Pending (Reviewed by Dr Briones)    Cath:9-2-24  Recommendations:  Maximize medical therapy.  Agressive risk factor modification efforts.  Consider referral to cardiac rehabilitation.     ____________________________________________________________________________________  CONCLUSIONS:   1. Single vessel disease.   2. The right posterior descending artery showed severe atherosclerotic disease.   3. Culprit vessel(s): right posterior descending artery.   4. Successful PCI of PDA.   5. Left Ventricular  end-diastolic pressure = 10.   6. Normal LV filling pressures.      Review of Systems   Constitutional: Negative. Negative for decreased appetite and malaise/fatigue.   HENT: Negative.     Eyes:  Negative for blurred vision and visual disturbance.   Cardiovascular:  Negative for chest pain, dyspnea on exertion, irregular heartbeat, leg swelling, orthopnea, palpitations and syncope.   Respiratory: Negative.  Negative for cough and shortness of breath.    Musculoskeletal:  Negative for arthritis and falls.   Gastrointestinal: Negative.    Neurological:  Negative for focal weakness and light-headedness.   Psychiatric/Behavioral:  Negative for depression and memory loss.         Pertinent Physical Exam At Time of Discharge  Physical Exam  Constitutional:       Appearance: Normal appearance.   HENT:      Head: Normocephalic.   Eyes:      Conjunctiva/sclera: Conjunctivae normal.   Cardiovascular:      Rate and Rhythm: Normal rate and regular rhythm.      Pulses: Normal pulses.      Heart sounds: S1 normal and S2 normal. No murmur heard.     No friction rub. No gallop.      Comments: Monitor: SR  Pulmonary:      Effort: Pulmonary effort is normal.      Breath sounds: Normal breath sounds.   Abdominal:      General: Bowel sounds are normal.      Palpations: Abdomen is soft.      Tenderness: There is no abdominal tenderness.   Musculoskeletal:      Cervical back: Neck supple.      Right lower leg: No edema.      Left lower leg: No edema.      Comments: R radial site without ecchymosis,swelling, tenderness. Good radial pulse, hand warm.    Skin:     General: Skin is warm and dry.   Neurological:      General: No focal deficit present.      Mental Status: He is alert and oriented to person, place, and time.   Psychiatric:         Attention and Perception: Attention normal.         Mood and Affect: Mood normal.          ASSESSMENT/PLAN  1) Inferior STEMI  S/P PCI of RCA (PDA)  DAPT and statins  Echo  Metoprolol  9-3-24: No  issues overnight.  Case d/w Dr Briones who looked at Echo: OK for d/c home today with close follow up. Discussed importance of DAPT with patient/family and activity instructions s/p PCI. Will recommend outpt cardiac rehab.      2) Type 2 DM  IMS to see  9-3-24: d/w IMS: OK to d/c home on prior DM meds    3)Tobacco use disorder  Encouraged to work on smoking cessation, patient seems motivated. Will give RX for Nicoderm.     Home today  [id number] for RX's  Close follow up being arranged with our office.     Home Medications     Medication List      START taking these medications     aspirin 81 mg EC tablet; Take 1 tablet (81 mg) by mouth once daily.;   Start taking on: September 4, 2024   metoprolol tartrate 25 mg tablet; Commonly known as: Lopressor; Take 1   tablet (25 mg) by mouth 2 times a day.   nicotine 14 mg/24 hr patch; Commonly known as: Nicoderm CQ; Place 1   patch over 24 hours on the skin once daily.; Start taking on: September 4, 2024   ticagrelor 90 mg tablet; Commonly known as: Brilinta; Take 1 tablet (90   mg) by mouth 2 times a day for 728 doses.     CHANGE how you take these medications     atorvastatin 80 mg tablet; Commonly known as: Lipitor; Take 1 tablet (80   mg) by mouth once daily at bedtime.; What changed: medication strength,   how much to take, when to take this     CONTINUE taking these medications     albuterol 90 mcg/actuation inhaler   gabapentin 600 mg tablet; Commonly known as: Neurontin   lisinopril 10 mg tablet   magnesium oxide 400 mg (241.3 mg magnesium) tablet; Commonly known as:   Mag-Ox   Trulicity 0.75 mg/0.5 mL pen injector; Generic drug: dulaglutide       Outpatient Follow-Up  1 week with Dr Briones  (office to arrange)    TOTAL d/c time > 35 minutes    Mary Ann Wilburn, DUC-CNP  9/3/2024  10:43 AM

## 2024-09-08 NOTE — PROGRESS NOTES
"Transitional Care Management:  Patient: Murali Love  : 1980  PCP: DUC JuniorCNP  MRN: [id number]  Admit Date: 2024  Discharge Date: 2024  Discharge Physician: ANTONIO Osorio  Discharge Diagnosis: STEMI  Discharge Location: Home       Murali Love is a 44 y.o. male presenting today for follow-up after being discharged from the hospital 6 days ago. The main problem requiring admission was STEMI. The discharge summary and/or Transitional Care Management documentation was reviewed. Medication reconciliation was performed as indicated via the \"Venancio as Reviewed\" timestamp.     Murali Love was contacted by Transitional Care Management services two days after his discharge. This encounter and supporting documentation was reviewed.    The complexity of medical decision making for this patient's transitional care is moderate.      Counseling:  The patient was counseled regarding diagnostic results, instructions for management, risk factor reductions, prognosis, patient and family education, impressions, risks and benefits of treatment options and importance of compliance with treatment.       History Of Present Illness:    Murali Love is a 44 y.o. male patient whose PMH is significant for asthma, HTN and nicotine dependence. He presents today in the company of his sister for post-hospitalization followup of STEMI. The patient was admitted to hospital from 2024 to 2024 after presenting to the ED with a chief complaint of chest pain. Upon presentation, EKG showed inferior STEMI with reciprocal depression in aVF. The patient was urgently taken to the cath lab, revealing single vessel disease, where he underwent PCI of the PDA. While in hospital, echocardiogram demonstrated an EF of 57%. Today, the patient states that he is feeling much significantly improved since being discharged from hospital. He denies any CP, chest discomfort or SOB. BP has been stable. EKG " "today shows NSR with no acute changes. The patient is compliant with his prescribed medications.      Last Recorded Vitals:  Vitals:    09/09/24 1117   BP: 118/76   Pulse: 77   Weight: 94.4 kg (208 lb 3.2 oz)   Height: 1.676 m (5' 6\")       Past Surgical History:  He has a past surgical history that includes Cardiac catheterization (N/A, 9/2/2024); Cardiac catheterization (N/A, 9/2/2024); and Cardiac catheterization (N/A, 9/2/2024).      Social History:  He reports that he has been smoking cigarettes. He has never used smokeless tobacco. He reports that he does not drink alcohol and does not use drugs.    Family History:  No family history on file.     Allergies:  Patient has no known allergies.    Outpatient Medications:  Current Outpatient Medications   Medication Instructions    albuterol 90 mcg/actuation inhaler 2 puffs, inhalation, Every 6 hours PRN    aspirin 81 mg, oral, Daily    atorvastatin (LIPITOR) 80 mg, oral, Nightly    BD Mariaelena 2nd Gen Pen Needle 32 gauge x 5/32\" needle USE WITH INSULIN AS DIRECTED    Brilinta 90 mg, oral, 2 times daily    diclofenac (Voltaren) 50 mg EC tablet take 1 tablet by mouth three times a day if needed for pain- take with food    Easy Touch Twist Lancets 30 gauge misc test blood sugar 1-2 times per day    FreeStyle Eileen 3 Sensor device USE TO CHECK BLOOD SUGARS FOUR TIMES DAILY BEFORE MELAS AND BEFORE BED    gabapentin (Neurontin) 600 mg tablet 1 tablet, oral, 3 times daily (0900,1400,1900)    Lantus Solostar U-100 Insulin 100 unit/mL (3 mL) pen inject 10 units subcutaneously once daily    lisinopril 10 mg tablet 1 tablet, oral, Daily (0630)    magnesium oxide (MAG-OX) 400 mg, oral, Once PRN Procedure, Asneeded before bed for leg cramps    meloxicam (Mobic) 15 mg tablet 1 tablet, oral, Daily (0630)    metoprolol tartrate (LOPRESSOR) 25 mg, oral, 2 times daily    nicotine (Nicoderm CQ) 14 mg/24 hr patch 1 patch, transdermal, Daily    True Metrix Glucose Meter misc test blood " sugar 1-2 times per day    True Metrix Glucose Test Strip strip test blood sugar 1-2 times a day and as needed    Trulicity 3 mg/0.5 mL pen injector inject 3mg once weekly subq in upper arm,thigh or abdomen and rotate sites    Trulicity 0.75 mg, subcutaneous, Once Weekly     Review of Systems   All other systems reviewed and are negative.     Physical Exam:  Constitutional:       Appearance: Healthy appearance. Not in distress.   Neck:      Vascular: No JVR. JVD normal.   Pulmonary:      Effort: Pulmonary effort is normal.      Breath sounds: Normal breath sounds. No wheezing. No rhonchi. No rales.   Chest:      Chest wall: Not tender to palpatation.   Cardiovascular:      PMI at left midclavicular line. Normal rate. Regular rhythm. Normal S1. Normal S2.       Murmurs: There is no murmur.      No gallop.  No click. No rub.   Pulses:     Intact distal pulses.   Edema:     Peripheral edema absent.   Abdominal:      General: Bowel sounds are normal.      Palpations: Abdomen is soft.      Tenderness: There is no abdominal tenderness.   Musculoskeletal: Normal range of motion.         General: No tenderness. Skin:     General: Skin is warm and dry.   Neurological:      General: No focal deficit present.      Mental Status: Alert and oriented to person, place and time.          Last Labs:  CBC -  Lab Results   Component Value Date    WBC 13.5 (H) 09/03/2024    HGB 16.6 09/03/2024    HCT 49.2 09/03/2024    MCV 85 09/03/2024     09/03/2024       CMP -  Lab Results   Component Value Date    CALCIUM 8.6 09/03/2024    PROT 7.3 09/02/2024    ALBUMIN 4.6 09/02/2024    AST 25 09/02/2024    ALT 25 09/02/2024    ALKPHOS 75 09/02/2024    BILITOT 0.6 09/02/2024       LIPID PANEL -   Lab Results   Component Value Date    CHOL 118 09/03/2024    TRIG 214 (H) 09/03/2024    HDL 27.5 09/03/2024    CHHDL 4.3 09/03/2024    VLDL 43 (H) 09/03/2024    NHDL 91 09/03/2024       RENAL FUNCTION PANEL -   Lab Results   Component Value Date     GLUCOSE 180 (H) 09/03/2024     09/03/2024    K 3.8 09/03/2024     09/03/2024    CO2 26 09/03/2024    ANIONGAP 8 (L) 09/03/2024    BUN 17 09/03/2024    CREATININE 1.25 09/03/2024    CALCIUM 8.6 09/03/2024    ALBUMIN 4.6 09/02/2024        Lab Results   Component Value Date    HGBA1C 7.4 (H) 09/02/2024       Last Cardiology Tests:  09/03/2024 - TTE  1. Left ventricular ejection fraction is normal, calculated by Mascorro's biplane at 57%.  2. There is normal right ventricular global systolic function.    09/02/2024 - Cardiac Catheterization (LH)  1. Single vessel disease.  2. The right posterior descending artery showed severe atherosclerotic disease.  3. Culprit vessel(s): right posterior descending artery.  4. Successful PCI of PDA.  5. Left Ventricular end-diastolic pressure = 10.  6. Normal LV filling pressures.    Lab review: I have personally reviewed the laboratory result(s).  Diagnostic review: I have personally reviewed the result(s) of the Echocardiogram and LHC.    Assessment/Plan   1) STEMI s/p PCI of PDA  On DAPT with ASA 81 mg daily and Brilinta 90 mg BID  On atorvastatin 80 mg daily, lisinopril 10 mg daily, metoprolol tartrate 25 mg BID   Hospital admission 09/02/2024 to 09/03/2024 with chest pain  EKG showed inferior STEMI with reciprocal depression in aVF  LHC with single vessel disease s/p PCI of PDA  TTE with LVEF 57%  Denies CP, chest discomfort or SOB  BP stable  EKG stable  Cardiac rehab  Continue current medical Rx   Followup with Blanka Nolasco NP, in 3 months      2) Hyperlipidemia  Goal LDL <70  On atorvastatin 80 mg daily  A1c 09/02/2024 of 7.4%  Lipid panel 09/03/2024 with total cholesterol, LDL and triglycerides of 118, 48 and 214 respectively; at LDL goal      Scribe Attestation  By signing my name below, I, Eloisa Devinee   attest that this documentation has been prepared under the direction and in the presence of Nathanael Briones MD.

## 2024-09-09 ENCOUNTER — OFFICE VISIT (OUTPATIENT)
Dept: CARDIOLOGY | Facility: HOSPITAL | Age: 44
End: 2024-09-09
Payer: COMMERCIAL

## 2024-09-09 VITALS
WEIGHT: 208.2 LBS | HEIGHT: 66 IN | SYSTOLIC BLOOD PRESSURE: 118 MMHG | DIASTOLIC BLOOD PRESSURE: 76 MMHG | BODY MASS INDEX: 33.46 KG/M2 | HEART RATE: 77 BPM

## 2024-09-09 DIAGNOSIS — I25.10 ASHD (ARTERIOSCLEROTIC HEART DISEASE): ICD-10-CM

## 2024-09-09 PROCEDURE — 93005 ELECTROCARDIOGRAM TRACING: CPT | Performed by: INTERNAL MEDICINE

## 2024-09-09 PROCEDURE — 3008F BODY MASS INDEX DOCD: CPT | Performed by: INTERNAL MEDICINE

## 2024-09-09 PROCEDURE — 99495 TRANSJ CARE MGMT MOD F2F 14D: CPT | Performed by: INTERNAL MEDICINE

## 2024-09-09 PROCEDURE — 4004F PT TOBACCO SCREEN RCVD TLK: CPT | Performed by: INTERNAL MEDICINE

## 2024-09-09 NOTE — PATIENT INSTRUCTIONS
Continue all current medications as prescribed. Please be sure to take your Brilinta faithfully with no interruptions.  Dr. Briones has placed a referral for cardiac rehabilitation.   Followup with Blanka Nolasco NP, in 3 months.    If you have any questions or cardiac concerns, please call our office at [phone].

## 2024-09-17 DIAGNOSIS — I21.3 ST ELEVATION MYOCARDIAL INFARCTION (STEMI), UNSPECIFIED ARTERY (MULTI): Primary | ICD-10-CM

## 2024-10-07 ENCOUNTER — APPOINTMENT (OUTPATIENT)
Dept: CARDIAC REHAB | Facility: HOSPITAL | Age: 44
End: 2024-10-07
Payer: COMMERCIAL

## 2024-10-15 ENCOUNTER — CLINICAL SUPPORT (OUTPATIENT)
Dept: CARDIAC REHAB | Facility: HOSPITAL | Age: 44
End: 2024-10-15
Payer: COMMERCIAL

## 2024-10-15 ENCOUNTER — TELEPHONE (OUTPATIENT)
Dept: CARDIOLOGY | Facility: HOSPITAL | Age: 44
End: 2024-10-15

## 2024-10-15 VITALS
WEIGHT: 199.8 LBS | HEART RATE: 92 BPM | HEIGHT: 66 IN | BODY MASS INDEX: 32.11 KG/M2 | OXYGEN SATURATION: 93 % | RESPIRATION RATE: 18 BRPM | DIASTOLIC BLOOD PRESSURE: 61 MMHG | SYSTOLIC BLOOD PRESSURE: 90 MMHG

## 2024-10-15 DIAGNOSIS — I25.10 ASHD (ARTERIOSCLEROTIC HEART DISEASE): Primary | ICD-10-CM

## 2024-10-15 DIAGNOSIS — I21.3 ST ELEVATION MYOCARDIAL INFARCTION (STEMI), UNSPECIFIED ARTERY (MULTI): Primary | ICD-10-CM

## 2024-10-15 SDOH — HEALTH STABILITY: PHYSICAL HEALTH: ON AVERAGE, HOW MANY MINUTES DO YOU ENGAGE IN EXERCISE AT THIS LEVEL?: 30 MIN

## 2024-10-15 SDOH — HEALTH STABILITY: PHYSICAL HEALTH: ON AVERAGE, HOW MANY DAYS PER WEEK DO YOU ENGAGE IN MODERATE TO STRENUOUS EXERCISE (LIKE A BRISK WALK)?: 6 DAYS

## 2024-10-15 ASSESSMENT — PATIENT HEALTH QUESTIONNAIRE - PHQ9
2. FEELING DOWN, DEPRESSED OR HOPELESS: NOT AT ALL
SUM OF ALL RESPONSES TO PHQ9 QUESTIONS 1 & 2: 0
1. LITTLE INTEREST OR PLEASURE IN DOING THINGS: NOT AT ALL

## 2024-10-15 ASSESSMENT — ENCOUNTER SYMPTOMS
OCCASIONAL FEELINGS OF UNSTEADINESS: 0
DEPRESSION: 0
LOSS OF SENSATION IN FEET: 0

## 2024-10-15 NOTE — TELEPHONE ENCOUNTER
RN called the pt at this time regarding low BP readings during cardiac rehab. Pt is nonsymptomatic at this time. RN notified Dr. Briones.

## 2024-10-15 NOTE — PROGRESS NOTES
Cardiac Rehabilitation Initial Treatment Plan    Name: Murali Love  Medical Record Number: [id number]  YOB: 1980  Age: 44 y.o.    Today’s Date: 10/15/2024  Primary Care Physician: DUC Junior-CNP  Referring Physician: Nathanael Briones MD  Program Location: [facility]  Primary Diagnosis:   1. ST elevation myocardial infarction (STEMI), unspecified artery (Multi)  Referral to Cardiac Rehab         Onset/Date of Diagnosis: 09/02/2024    Cardiac Rehab Session #: 1    AACVPR Risk Stratification:  Moderate     Falls Risk: Low  Psychosocial Assessment   10/15/2024 - Orientation Eval -- Pt denies stress/anxiety/depression, has some sleep issues. Encouraged to attend Stress education session. PHQ-2 score (0) No PHQ-9 done.    Pt reported/currently experiencing stress: No  Patient uses stress management skills: Yes   History of: no history of anxiety or depression  Currently seeing a mental health provider: No  Social Support: Yes, Whom:Family  Quality of Life Survey:  PHQ-2  Learning Assessment:  Learning assessment/barriers: None  Preferred learning method: Visual  Barriers: None  Comments:    Stages of Change:Contemplation    Psychosocial Plan    Goal Status: Initial Assessment; goals not yet started    Psychosocial Goals: Demonstrating proper techniques for stress management and Identify social supports    Psychosocial Interventions/Education:   Encouraged attendance for stress management lecture.  Instructed patient on 3 minute breathing space.    Nutrition Assessment:  10/15/2024 - Orientation Eval -- Pt states he is working on a heart healthy diabetic diet with his girlfriend. Dietician referral discussed, paper given, Dr Briones's nurse messaged regarding pt request for referral. Lipids reviewed w/ pt.  Hyperlipidemia: Yes     Lipids:   Lab Results   Component Value Date    CHOL 118 09/03/2024    HDL 27.5 09/03/2024    TRIG 214 (H) 09/03/2024       Current Dietary Guidelines: Low  "fat, ADA  Barriers to dietary change: no    Diet Habit Survey: Block Fat Screener  Pre: Initial survey given. Pending completion and return from patient.  Post: To be done at discharge.    Diabetes Assessment    Lab Results   Component Value Date    HGBA1C 7.4 (H) 09/02/2024       History of Diabetes: Yes Pt monitors BS at home: No     Hypoglycemic Episodes: No     Weight Management    Height: 167.6 cm (5' 6\")  Weight: 90.6 kg (199 lb 12.8 oz)  BMI (Calculated): 32.26  No data recorded    Nutrition Plan    Goal Status: Initial Assessment; goals not yet started    Nutrition Goals: Lipid Goal: HDL>45, LDL <70, Total <180, Trigs <150, Learn how to read and interpret nutrition labels prior to discharge, Lose 1lb/week while enrolled in program, Check blood glucose daily, and Verbalize S/S of hypoglycemia or hyperglycemia by discharge    Nutrition Interventions/Education:   Booklet given \"Heart Attack - Bouncing Back\"   Encouraged patient to follow a heart healthy diet  Encourage patient to meet with a  out patient dietician, information provided, referral requested.  Lipid profile reviewed    Exercise Assessment  10/15/2024 - Orientation Eval -- Pt works 14 hr days and is on his feet, lots of walking running 4 Ping Communication.   Yes  Mode: Walking  Frequency: 6 days per week  Duration: 6+ hours daily    Exercise Prescription     Exercise Prescription based on: 6 Minute Walk Test          Frequency:  2 days/week   Mode: Treadmill, Recumbent Cycle, Arm Ergometer, and Stepper   Duration: 40 total aerobic minutes   Intensity: RPE 11-13  Target HR:   104-139  MET Level: 3-6  Patient wears supplemental O2: No     Modality Workload METs Duration (minutes)   1 Pre-Exercise      2 Arm Ergometer 5 plasencia  ? 7 :00   3 Recumbent Bike 2  ? 7 :00   4 Stepper 1.5  ? 7 :00   5 Treadmill 2.2/0  ? 7 :00   6 Post-Exercise        Resistance Training: No   Home Exercise Prescription given: To be given prior to discharge from program.    Exercise " "Plan    Goal Status: Initial Assessment; goals not yet started    Exercise Goals: Increase exercise MET level by 5-10% each week, Increase total exercise duration to 30-45 minutes, Obtain 150 minutes/week of moderate intensity aerobic exercise, and Establish a home exercise program before discharge    Exercise Interventions/Education:   Exercise prescription based on 6 MWT   Exercise changes made based on HR & RPE scale in response to exercise  Increase duration by 1-2 minutes per modality for a total of 40 minutes per session  Target goal for duration 40 minutes per session, increase MET levels by 5-10% every 30 days or as tolerated  Encouraged home exercise on days not in rehab    Other Core Components/Risk Factor Assessment:  10/15/2024 - Orientation Eval -- Pt is Taking his meds as prescribed, using a pill box and carrying a medication list. His BP is at low at rest this time. Dr Briones's service notified. Pt denies lightheadedness.  Medication adherence  Current Medications:   Medication Documentation Review Audit       Reviewed by Ian Machado RN (Registered Nurse) on 10/15/24 at 1319      Medication Order Taking? Sig Documenting Provider Last Dose Status   albuterol 90 mcg/actuation inhaler [id number]  Inhale 2 puffs every 6 hours if needed. Historical Provider, MD  Active   aspirin 81 mg EC tablet [id number]  Take 1 tablet (81 mg) by mouth once daily. ANTONIO Clements  Active   atorvastatin (Lipitor) 80 mg tablet [id number]  Take 1 tablet (80 mg) by mouth once daily at bedtime. ANTONIO Clements  Active   BD Mariaelena 2nd Gen Pen Needle 32 gauge x 5/32\" needle [id number]  USE WITH INSULIN AS DIRECTED Historical Provider, MD  Active   diclofenac (Voltaren) 50 mg EC tablet [id number]  take 1 tablet by mouth three times a day if needed for pain- take with food Historical Provider, MD  Active   dulaglutide (Trulicity) 4.5 mg/0.5 mL pen injector [id number]  inject the contents of one pen (4.5mg) under the " skin once weekly   Active   Easy Touch Twist Lancets 30 gauge misc [id number]  test blood sugar 1-2 times per day Historical Provider, MD  Active   FreeStyle Eileen 3 Sensor device [id number]  USE TO CHECK BLOOD SUGARS FOUR TIMES DAILY BEFORE MELAS AND BEFORE BED Historical Provider, MD  Active   gabapentin (Neurontin) 600 mg tablet [id number]  Take 1 tablet (600 mg) by mouth 3 times a day. Historical Provider, MD  Active   Lantus Solostar U-100 Insulin 100 unit/mL (3 mL) pen [id number] No inject 10 units subcutaneously once daily Historical Provider, MD Not Taking Active   lisinopril 10 mg tablet [id number]  Take 1 tablet (10 mg) by mouth early in the morning.. Historical Provider, MD  Active   magnesium oxide (Mag-Ox) 400 mg (241.3 mg magnesium) tablet [id number]  Take 1 tablet (400 mg) by mouth 1 time if needed. Asneeded before bed for leg cramps Historical Provider, MD  Active   meloxicam (Mobic) 15 mg tablet [id number] No Take 1 tablet (15 mg) by mouth early in the morning.. Historical Provider, MD Not Taking Active   metoprolol tartrate (Lopressor) 25 mg tablet [id number]  Take 1 tablet (25 mg) by mouth 2 times a day. ANTONIO Clements  Active   nicotine (Nicoderm CQ) 14 mg/24 hr patch [id number]  Place 1 patch over 24 hours on the skin once daily.   Patient not taking: Reported on 9/9/2024    ANTONIO Clements  Active   ticagrelor (Brilinta) 90 mg tablet [id number]  Take 1 tablet (90 mg) by mouth 2 times a day for 728 doses. ANTONIO Clements  Active   True Metrix Glucose Meter misc [id number]  test blood sugar 1-2 times per day Historical Provider, MD  Active   True Metrix Glucose Test Strip strip [id number]  test blood sugar 1-2 times a day and as needed Historical Provider, MD  Active   Trulicity 0.75 mg/0.5 mL pen injector [id number] No Inject 0.75 mg under the skin 1 (one) time per week. Historical Provider, MD Not Taking Active   Trulicity 3 mg/0.5 mL pen injector [id number] No inject 3mg once  weekly subq in upper arm,thigh or abdomen and rotate sites Historical Provider, MD Not Taking Active                                 Medication compliance: Yes   Uses pill box/organizer: Yes    Carries medication list: Yes     Blood Pressure Management  History of Hypertension: Yes   Medication Changes: No   Resting BP:  Visit Vitals  BP 90/61 (BP Location: Right arm, Patient Position: Sitting, BP Cuff Size: Large adult long)   Pulse 92   Resp 18        Heart Failure Management  Hx of Heart Failure: No    Smoking/Tobacco Assessment  Social History     Tobacco Use   Smoking Status Every Day    Types: Cigarettes   Smokeless Tobacco Never       Other Core Component Plan    Goal Status: Initial Assessment; goals not yet started    Other Core Component Goals: Medication compliance, Verbalize medication usage and drug actions by discharge, Set tobacco cessation quit date while enrolled, and Achieve resting BP of < 130/80 by discharge    Other Core Component Interventions/Education:   Pt is taking all meds as prescribed and carries an updated medication list  Blood pressure remains within target goal  Check resting BP pre and post exercise  Encouraged to carry an updated medication list  Reviewed effects of exercise on Blood Pressure  Work on smoking cessation - pt already on Nicotine patch and would like to quit.    Individual Patient Goals:    Pt would like to exercise to improve health and prevent cardiac issues  Pt would like to quit smoking and improve his diet    Goal Status: Initial Assessment; goals not yet started    Staff Comments:  10/15/2024 - Orientation Eval -- Pt plans to attend 2 days per week for 36 sessions in order to achieve his goals.  Orientation: BP R arm sitting 92/61, standing 83/53, PO2 94%, HR 93 bpm. Lung sounds diminished in posterior bases bilaterally with wheeze noted in L base. Heart sounds S1S2 RRR, Radial pulses +2 bilat. No peripheral edema noted.  6 MWT: 1410 ft = 2.7 mph = 3.0 METs. RPE  11. /73, PO2 95%, HR 95 bpm. Pt walked well without physical limitation or cardiac complaints.   Cool down: BP 90/61, PO2 93%, HR 92 bpm.    Rehab Staff Signature: Ian Machado RN

## 2024-10-15 NOTE — TELEPHONE ENCOUNTER
RN called pt back at after notifying Dr. Briones that pts BP today was very low during Cardiac Rehab. Per Dr. Briones verbal order to decrease Lisinopril to 5 mg daily. RN placed new order for approval. Pt verbalized understanding.

## 2024-10-16 RX ORDER — LISINOPRIL 5 MG/1
5 TABLET ORAL
Qty: 90 TABLET | Refills: 0 | Status: SHIPPED | OUTPATIENT
Start: 2024-10-16 | End: 2025-01-14

## 2024-11-01 ENCOUNTER — APPOINTMENT (OUTPATIENT)
Dept: CARDIAC REHAB | Facility: HOSPITAL | Age: 44
End: 2024-11-01
Payer: COMMERCIAL

## 2024-11-04 ENCOUNTER — CLINICAL SUPPORT (OUTPATIENT)
Dept: CARDIAC REHAB | Facility: HOSPITAL | Age: 44
End: 2024-11-04
Payer: COMMERCIAL

## 2024-11-04 DIAGNOSIS — I21.3 ST ELEVATION MYOCARDIAL INFARCTION (STEMI), UNSPECIFIED ARTERY (MULTI): ICD-10-CM

## 2024-11-04 PROCEDURE — 93798 PHYS/QHP OP CAR RHAB W/ECG: CPT | Performed by: INTERNAL MEDICINE

## 2024-11-06 ENCOUNTER — CLINICAL SUPPORT (OUTPATIENT)
Dept: CARDIAC REHAB | Facility: HOSPITAL | Age: 44
End: 2024-11-06
Payer: COMMERCIAL

## 2024-11-06 DIAGNOSIS — I21.3 ST ELEVATION MYOCARDIAL INFARCTION (STEMI), UNSPECIFIED ARTERY (MULTI): ICD-10-CM

## 2024-11-06 PROCEDURE — 93798 PHYS/QHP OP CAR RHAB W/ECG: CPT | Performed by: INTERNAL MEDICINE

## 2024-11-11 ENCOUNTER — APPOINTMENT (OUTPATIENT)
Dept: CARDIAC REHAB | Facility: HOSPITAL | Age: 44
End: 2024-11-11
Payer: COMMERCIAL

## 2024-11-12 ENCOUNTER — DOCUMENTATION (OUTPATIENT)
Dept: CARDIAC REHAB | Facility: HOSPITAL | Age: 44
End: 2024-11-12
Payer: COMMERCIAL

## 2024-11-12 NOTE — PROGRESS NOTES
Cardiac Rehabilitation Initial Treatment Plan    Name: Murali Love  Medical Record Number: [id number]  YOB: 1980  Age: 44 y.o.    Today’s Date: 11/12/2024  Primary Care Physician: DUC Junior-CNP  Referring Physician: Dr Briones  Program Location: [facility]  Primary Diagnosis: STEMI I21.3     Onset/Date of Diagnosis: 09/02/2024    Cardiac Rehab Session #: 3    AACVPR Risk Stratification:  Moderate     Falls Risk: Low  Psychosocial Assessment   11/12/2024 - 30 Day Eval -- Pt questioned re: new stress, anxiety or depressive symptoms; pt denies any new stress; pt is aware of stress reduction techniques and uses them as needed.   10/15/2024 - Orientation Eval -- Pt denies stress/anxiety/depression, has some sleep issues. Encouraged to attend Stress education session. PHQ-2 score (0) No PHQ-9 done.    Pt reported/currently experiencing stress: No  Patient uses stress management skills: Yes   History of: no history of anxiety or depression  Currently seeing a mental health provider: No  Social Support: Yes, Whom:Family  Quality of Life Survey:  PHQ-2  Learning Assessment:  Learning assessment/barriers: None  Preferred learning method: Visual  Barriers: None    Stages of Change:Action    Psychosocial Plan    Goal Status: In progress    Psychosocial Goals: Demonstrating proper techniques for stress management and Identify social supports    Psychosocial Interventions/Education:   Encouraged attendance for stress management lecture.  Instructed patient on 3 minute breathing space.    Nutrition Assessment:  11/12/2024 - 30 Day Eval -- Pt is eating a heart healthy low fat diet. Pt is arranging dietician referral visit. No new lipid labs.   10/15/2024 - Orientation Eval -- Pt states he is working on a heart healthy diabetic diet with his girlfriend. Dietician referral discussed, paper given, Dr Briones's nurse messaged regarding pt request for referral. Lipids reviewed w/  "pt.  Hyperlipidemia: Yes     Lipids:   Lab Results   Component Value Date    CHOL 118 09/03/2024    HDL 27.5 09/03/2024    TRIG 214 (H) 09/03/2024       Current Dietary Guidelines: Low fat, ADA  Barriers to dietary change: no    Diet Habit Survey: Block Fat Screener  Pre:  Pt returned initial fat screener showing high fat intake in his diet. He is encouraged to cut down on his most frequent high fat foods. AHA Healthy Fat handout given.   Post: To be done at discharge.    Diabetes Assessment    Lab Results   Component Value Date    HGBA1C 7.4 (H) 09/02/2024       History of Diabetes: Yes Pt monitors BS at home: No     Hypoglycemic Episodes: No     Nutrition Plan    Goal Status: In progress    Nutrition Goals: Lipid Goal: HDL>45, LDL <70, Total <180, Trigs <150, Learn how to read and interpret nutrition labels prior to discharge, Lose 1lb/week while enrolled in program, Check blood glucose daily, and Verbalize S/S of hypoglycemia or hyperglycemia by discharge    Nutrition Interventions/Education:   Booklet given \"Heart Attack - Bouncing Back\"   Encouraged patient to follow a heart healthy diet  Encourage patient to meet with a  out patient dietician, information provided, referral requested.  Lipid profile reviewed    Exercise Assessment  11/12/2024 - 30 Day Eval -- Pt attended Exercise education session with handout. Pt is exercising 28 min per session at 2.7 to 3.3 METs.   10/15/2024 - Orientation Eval -- Pt works 14 hr days and is on his feet, lots of walking running 4 businesses.   Yes  Mode: Walking  Frequency: 6 days per week  Duration: 6+ hours daily    Exercise Prescription     Exercise Prescription based on: 6 Minute Walk Test          Frequency:  2 days/week   Mode: Treadmill, Recumbent Cycle, Arm Ergometer, and Stepper   Duration: 40 total aerobic minutes   Intensity: RPE 11-13  Target HR:   104-139  MET Level: 3-6  Patient wears supplemental O2: No     Modality Workload METs Duration (minutes)   1 " Pre-Exercise      2 Arm Ergometer 5 plasencia  ? 7 :00   3 Recumbent Bike 2  2.7 7 :00   4 Stepper 1.5  3.3 7 :00   5 Treadmill 2.2/0  3.0 7 :00   6 Post-Exercise        Resistance Training: No   Home Exercise Prescription given: To be given prior to discharge from program.    Exercise Plan    Goal Status: In progress    Exercise Goals: Increase exercise MET level by 5-10% each week, Increase total exercise duration to 30-45 minutes, Obtain 150 minutes/week of moderate intensity aerobic exercise, and Establish a home exercise program before discharge    Exercise Interventions/Education:   Exercise prescription based on 6 MWT   Exercise changes made based on HR & RPE scale in response to exercise  Increase duration by 1-2 minutes per modality for a total of 40 minutes per session  Target goal for duration 40 minutes per session, increase MET levels by 5-10% every 30 days or as tolerated  Encouraged home exercise on days not in rehab    Other Core Components/Risk Factor Assessment:  11/12/2024 - 30 Day Eval -- Pt is taking hisher meds as prescribed, using a pill box and carrying a medication list. HisHer BP is at goal at this time. BP is improved after decreasing Lisinopril dose to 5 mg daily per Dr Briones.   10/15/2024 - Orientation Eval -- Pt is Taking his meds as prescribed, using a pill box and carrying a medication list. His BP is at low at rest this time. Dr Briones's service notified. Pt denies lightheadedness.  Medication adherence  Current Medications:   Medication Documentation Review Audit       Reviewed by Ian Machado RN (Registered Nurse) on 10/15/24 at 1319      Medication Order Taking? Sig Documenting Provider Last Dose Status   albuterol 90 mcg/actuation inhaler [id number]  Inhale 2 puffs every 6 hours if needed. Historical Provider, MD  Active   aspirin 81 mg EC tablet [id number]  Take 1 tablet (81 mg) by mouth once daily. Mary Ann Wilburn, APRN-CNP  Active   atorvastatin (Lipitor) 80 mg tablet [id number]   "Take 1 tablet (80 mg) by mouth once daily at bedtime. ANTONIO Clements  Active   BD Mariaelena 2nd Gen Pen Needle 32 gauge x 5/32\" needle [id number]  USE WITH INSULIN AS DIRECTED Historical Provider, MD  Active   diclofenac (Voltaren) 50 mg EC tablet [id number]  take 1 tablet by mouth three times a day if needed for pain- take with food Historical Provider, MD  Active   dulaglutide (Trulicity) 4.5 mg/0.5 mL pen injector [id number]  inject the contents of one pen (4.5mg) under the skin once weekly   Active   Easy Touch Twist Lancets 30 gauge misc [id number]  test blood sugar 1-2 times per day Historical Provider, MD  Active   FreeStyle Eileen 3 Sensor device [id number]  USE TO CHECK BLOOD SUGARS FOUR TIMES DAILY BEFORE MELAS AND BEFORE BED Historical Provider, MD  Active   gabapentin (Neurontin) 600 mg tablet [id number]  Take 1 tablet (600 mg) by mouth 3 times a day. Historical Provider, MD  Active   Lantus Solostar U-100 Insulin 100 unit/mL (3 mL) pen [id number] No inject 10 units subcutaneously once daily Historical Provider, MD Not Taking Active   lisinopril 10 mg tablet [id number]  Take 1 tablet (10 mg) by mouth early in the morning.. Historical Provider, MD  Active   magnesium oxide (Mag-Ox) 400 mg (241.3 mg magnesium) tablet [id number]  Take 1 tablet (400 mg) by mouth 1 time if needed. Asneeded before bed for leg cramps Historical Provider, MD  Active   meloxicam (Mobic) 15 mg tablet [id number] No Take 1 tablet (15 mg) by mouth early in the morning.. Historical Provider, MD Not Taking Active   metoprolol tartrate (Lopressor) 25 mg tablet [id number]  Take 1 tablet (25 mg) by mouth 2 times a day. ANTONIO Clements  Active   nicotine (Nicoderm CQ) 14 mg/24 hr patch [id number]  Place 1 patch over 24 hours on the skin once daily.   Patient not taking: Reported on 9/9/2024    ANTONIO Clements  Active   ticagrelor (Brilinta) 90 mg tablet [id number]  Take 1 tablet (90 mg) by mouth 2 times a day for 728 doses. " Mary Ann Wilburn, APRN-CNP  Active   True Metrix Glucose Meter misc [id number]  test blood sugar 1-2 times per day Historical Provider, MD  Active   True Metrix Glucose Test Strip strip [id number]  test blood sugar 1-2 times a day and as needed Historical Provider, MD  Active   Trulicity 0.75 mg/0.5 mL pen injector [id number] No Inject 0.75 mg under the skin 1 (one) time per week. Historical Provider, MD Not Taking Active   Trulicity 3 mg/0.5 mL pen injector [id number] No inject 3mg once weekly subq in upper arm,thigh or abdomen and rotate sites Historical Provider, MD Not Taking Active                                 Medication compliance: Yes   Uses pill box/organizer: Yes    Carries medication list: Yes     Blood Pressure Management  History of Hypertension: Yes   Medication Changes: Yes   Resting BP:  115/71       Heart Failure Management  Hx of Heart Failure: No    Smoking/Tobacco Assessment  Social History     Tobacco Use   Smoking Status Every Day    Types: Cigarettes   Smokeless Tobacco Never       Other Core Component Plan    Goal Status: In progress    Other Core Component Goals: Medication compliance, Verbalize medication usage and drug actions by discharge, Set tobacco cessation quit date while enrolled, and Achieve resting BP of < 130/80 by discharge    Other Core Component Interventions/Education:   Pt is taking all meds as prescribed and carries an updated medication list  Blood pressure remains within target goal  Check resting BP pre and post exercise  Encouraged to carry an updated medication list  Reviewed effects of exercise on Blood Pressure  Work on smoking cessation - pt already on Nicotine patch and would like to quit.    Education sessions attended with handouts:  Exercise    Individual Patient Goals:    Pt would like to exercise to improve health and prevent cardiac issues  Pt would like to quit smoking and improve his diet    Goal Status: In progress    Staff Comments:  10/15/2024 - Orientation  Eval -- Pt plans to attend 2 days per week for 36 sessions in order to achieve his goals.  Orientation: BP R arm sitting 92/61, standing 83/53, PO2 94%, HR 93 bpm. Lung sounds diminished in posterior bases bilaterally with wheeze noted in L base. Heart sounds S1S2 RRR, Radial pulses +2 bilat. No peripheral edema noted.  6 MWT: 1410 ft = 2.7 mph = 3.0 METs. RPE 11. /73, PO2 95%, HR 95 bpm. Pt walked well without physical limitation or cardiac complaints.   Cool down: BP 90/61, PO2 93%, HR 92 bpm.  11/12/2024 - 30 Day Eval -- Pt is trying to eat a lower fat diet and has lost 5.6# since initial weight. No new lipid labs. Pt is taking his meds as prescribed and after decreasing lisinopril dose to 5 mg daily his BP is at goal. He denies new stress and is aware of the use of stress management techniques. He is exercising without cardiac symptoms. He denies falls or hospitalization for cardiac issues.   Rehab Staff Signature: Ian Machado RN

## 2024-11-13 ENCOUNTER — CLINICAL SUPPORT (OUTPATIENT)
Dept: CARDIAC REHAB | Facility: HOSPITAL | Age: 44
End: 2024-11-13
Payer: COMMERCIAL

## 2024-11-13 DIAGNOSIS — I21.3 ST ELEVATION MYOCARDIAL INFARCTION (STEMI), UNSPECIFIED ARTERY (MULTI): ICD-10-CM

## 2024-11-13 PROCEDURE — 93798 PHYS/QHP OP CAR RHAB W/ECG: CPT | Performed by: INTERNAL MEDICINE

## 2024-11-18 ENCOUNTER — APPOINTMENT (OUTPATIENT)
Dept: CARDIAC REHAB | Facility: HOSPITAL | Age: 44
End: 2024-11-18
Payer: COMMERCIAL

## 2024-11-20 ENCOUNTER — APPOINTMENT (OUTPATIENT)
Dept: CARDIAC REHAB | Facility: HOSPITAL | Age: 44
End: 2024-11-20
Payer: COMMERCIAL

## 2024-11-25 ENCOUNTER — APPOINTMENT (OUTPATIENT)
Dept: CARDIAC REHAB | Facility: HOSPITAL | Age: 44
End: 2024-11-25
Payer: COMMERCIAL

## 2024-11-27 ENCOUNTER — APPOINTMENT (OUTPATIENT)
Dept: CARDIAC REHAB | Facility: HOSPITAL | Age: 44
End: 2024-11-27
Payer: COMMERCIAL

## 2024-12-09 RX ORDER — CYCLOBENZAPRINE HCL 10 MG
10 TABLET ORAL
COMMUNITY
Start: 2024-10-18

## 2024-12-09 RX ORDER — DOCUSATE SODIUM 50 MG AND SENNOSIDES 8.6 MG 8.6; 5 MG/1; MG/1
TABLET, FILM COATED ORAL
COMMUNITY
Start: 2024-11-11

## 2024-12-09 RX ORDER — DOCUSATE SODIUM 100 MG/1
1 CAPSULE, LIQUID FILLED ORAL
COMMUNITY
Start: 2024-11-11

## 2024-12-09 RX ORDER — BUDESONIDE, GLYCOPYRROLATE, AND FORMOTEROL FUMARATE 160; 9; 4.8 UG/1; UG/1; UG/1
AEROSOL, METERED RESPIRATORY (INHALATION)
COMMUNITY
Start: 2024-11-18

## 2024-12-12 ENCOUNTER — TELEPHONE (OUTPATIENT)
Dept: CARDIOLOGY | Facility: HOSPITAL | Age: 44
End: 2024-12-12
Payer: COMMERCIAL

## 2024-12-12 PROBLEM — E78.5 HYPERLIPIDEMIA: Status: ACTIVE | Noted: 2024-12-12

## 2024-12-12 PROBLEM — I10 HYPERTENSION: Status: ACTIVE | Noted: 2024-12-12

## 2024-12-12 RX ORDER — METOPROLOL TARTRATE 25 MG/1
25 TABLET, FILM COATED ORAL 2 TIMES DAILY
Qty: 180 TABLET | Refills: 3 | Status: CANCELLED | OUTPATIENT
Start: 2024-12-12 | End: 2025-12-12

## 2024-12-12 RX ORDER — ATORVASTATIN CALCIUM 80 MG/1
80 TABLET, FILM COATED ORAL NIGHTLY
Qty: 90 TABLET | Refills: 3 | Status: CANCELLED | OUTPATIENT
Start: 2024-12-12 | End: 2025-12-12

## 2024-12-12 ASSESSMENT — ENCOUNTER SYMPTOMS
COUGH: 0
HEMATOCHEZIA: 0
VOMITING: 0
ORTHOPNEA: 0
ALTERED MENTAL STATUS: 0
SHORTNESS OF BREATH: 0
PALPITATIONS: 0
CHILLS: 0
HEMATURIA: 0
WHEEZING: 0
SYNCOPE: 0
NAUSEA: 0
IRREGULAR HEARTBEAT: 0
NEAR-SYNCOPE: 0
FEVER: 0

## 2024-12-12 NOTE — PROGRESS NOTES
Chief Complaint/Reason for Visit:  Follow-up (3 month) 3 month cardiovascular follow up    History Of Present Illness:    Murali Love is a 44 y.o. male that presents to the office for 3 month follow up.    Taking medications as prescribed.     PMH is significant for CAD s/p STEMI with PCI PDA Sept 2024, asthma, HTN and nicotine dependence. Current smoker - cigarettes.     He has cut back from 2 PPD to 1/2 PPD.    He works as a manager of [employer].  He completed part of cardiac rehab program, but then became too busy at work and had to stop.    Past Medical History:  He has a past medical history of Asthma, Diabetes mellitus (Multi), Diabetic neuropathy (Multi), and HTN (hypertension).    Past Surgical History:  He has a past surgical history that includes Cardiac catheterization (N/A, 9/2/2024); Cardiac catheterization (N/A, 9/2/2024); and Cardiac catheterization (N/A, 9/2/2024).      Social History:  He reports that he has been smoking cigarettes. He has never used smokeless tobacco. He reports that he does not drink alcohol and does not use drugs.    Family History:  No family history on file.     Allergies:  Patient has no known allergies.    Review of Systems   Constitutional: Negative for chills and fever.   Cardiovascular:  Positive for dyspnea on exertion (chronic). Negative for chest pain, irregular heartbeat, leg swelling, near-syncope, orthopnea, palpitations and syncope.   Respiratory:  Negative for cough, shortness of breath and wheezing.    Gastrointestinal:  Negative for hematochezia, melena, nausea and vomiting.   Genitourinary:  Negative for hematuria.   Psychiatric/Behavioral:  Negative for altered mental status.        Objective      Vitals reviewed.   Constitutional:       Appearance: Healthy appearance.   Pulmonary:      Effort: Pulmonary effort is normal.      Breath sounds: Normal breath sounds.   Cardiovascular:      PMI at left midclavicular line. Normal rate. Regular rhythm. S1  "with normal intensity. S2 with normal intensity.       Murmurs: There is no murmur.   Edema:     Peripheral edema absent.   Abdominal:      General: Bowel sounds are normal.   Skin:     General: Skin is warm and dry.   Psychiatric:         Attention and Perception: Attention normal.         Mood and Affect: Mood normal.         Behavior: Behavior is cooperative.         Current Outpatient Medications   Medication Instructions    albuterol 90 mcg/actuation inhaler 2 puffs, Every 6 hours PRN    aspirin 81 mg, oral, Daily    atorvastatin (LIPITOR) 80 mg, oral, Nightly    BD Mariaelena 2nd Gen Pen Needle 32 gauge x 5/32\" needle USE WITH INSULIN AS DIRECTED    [facility] 160-9-4.8 mcg/actuation HFA aerosol inhaler take 2 puffs twice a day- RINSE MOUTH WELL AFTER EACH USE    cyclobenzaprine (FLEXERIL) 10 mg    docusate sodium (Colace) 100 mg capsule 1 capsule, Every 12 hours scheduled (0630,1830)    dulaglutide (Trulicity) 4.5 mg/0.5 mL pen injector inject the contents of one pen (4.5mg) under the skin once weekly    Easy Touch Twist Lancets 30 gauge misc test blood sugar 1-2 times per day    FreeStyle Eileen 3 Sensor device USE TO CHECK BLOOD SUGARS FOUR TIMES DAILY BEFORE MELAS AND BEFORE BED    gabapentin (Neurontin) 600 mg tablet 1 tablet, 3 times daily (0900,1400,1900)    lisinopril 5 mg, oral, Daily (0630)    magnesium oxide (MAG-OX) 400 mg, Once PRN Procedure    meloxicam (Mobic) 15 mg tablet 1 tablet, Daily (0630)    metoprolol tartrate (LOPRESSOR) 25 mg, oral, 2 times daily    nicotine (Nicoderm CQ) 14 mg/24 hr patch 1 patch, transdermal, Daily    Stimulant Laxative Plus 8.6-50 mg tablet take 1-2 tabs by mouth daily as needed for constipation    ticagrelor (BRILINTA) 90 mg, oral, 2 times daily    True Metrix Glucose Meter misc test blood sugar 1-2 times per day    True Metrix Glucose Test Strip strip test blood sugar 1-2 times a day and as needed    Trulicity 3 mg/0.5 mL pen injector inject 3mg once weekly subq " "in upper arm,thigh or abdomen and rotate sites    Trulicity 0.75 mg, Once Weekly        Reviewed the following Labs:  CBC -  Lab Results   Component Value Date    WBC 13.5 (H) 09/03/2024    HGB 16.6 09/03/2024    HCT 49.2 09/03/2024    MCV 85 09/03/2024     09/03/2024       RENAL FUNCTION PANEL -   Lab Results   Component Value Date    GLUCOSE 180 (H) 09/03/2024     09/03/2024    K 3.8 09/03/2024     09/03/2024    CO2 26 09/03/2024    ANIONGAP 8 (L) 09/03/2024    BUN 17 09/03/2024    CREATININE 1.25 09/03/2024    CALCIUM 8.6 09/03/2024    ALBUMIN 4.6 09/02/2024        CMP -  Lab Results   Component Value Date    CALCIUM 8.6 09/03/2024    PROT 7.3 09/02/2024    ALBUMIN 4.6 09/02/2024    AST 25 09/02/2024    ALT 25 09/02/2024    ALKPHOS 75 09/02/2024    BILITOT 0.6 09/02/2024       LIPID PANEL -   Lab Results   Component Value Date    CHOL 118 09/03/2024    TRIG 214 (H) 09/03/2024    HDL 27.5 09/03/2024    CHHDL 4.3 09/03/2024    VLDL 43 (H) 09/03/2024    NHDL 91 09/03/2024     Lab Results   Component Value Date    LDLCALC 48 09/03/2024       Lab Results   Component Value Date    HGBA1C 7.4 (H) 09/02/2024       No results found for: \"TSH\"    No results found for this or any previous visit.     Reviewed the following Cardiology Tests:    Echo 9/3/24:   1. Left ventricular ejection fraction is normal, calculated by Mascorro's biplane at 57%.   2. There is normal right ventricular global systolic function.    LHC 9/2/24:   1. Single vessel disease.   2. The right posterior descending artery showed severe atherosclerotic disease.   3. Culprit vessel(s): right posterior descending artery.   4. Successful PCI of PDA.   5. Left Ventricular end-diastolic pressure = 10.   6. Normal LV filling pressures.    Visit Vitals  /74 (BP Location: Left arm)   Pulse 84   Ht 1.651 m (5' 5\")   Wt 91.2 kg (201 lb)   SpO2 96%   BMI 33.45 kg/m²   Smoking Status Every Day   BSA 2.05 m²       Assessment/Plan   The " primary encounter diagnosis was Hyperlipidemia, unspecified hyperlipidemia type. Diagnoses of ASHD (arteriosclerotic heart disease) and Hypertension, unspecified type were also pertinent to this visit.    1. CAD s/p STEMI with PCI PDA in Sept 2024  Continue DAPT - aspirin 81 mg daily and ticagrelor 90 mg BID  Continue atorvastatin 80 mg daily and Metoprolol tartrate 25 mg BID  TTE Sept 2024 with LVEF 57%     2. Dyslipidemia  Recommend Mediterranean style of eating  Goal LDL <70.  Currently at goal.  Continue atorvastatin 80 mg daily.     3. HTN   Stable  Continue current antihypertensives: lisinopril 5 mg daily and metoprolol tartrate 25 mg BID     4. DM  Management per PCP    5. Nicotine dependence  Discussed smoking cessation face to face and educated on detrimental effects to patients health including worsening, lung disease and HTN. Educated on benefits of quitting smoking including reducing risk of CAD, lung disease, peptic ulcers, cancer and osteoporosis. Educated on available options to help in quitting smoking including nicotine patch, nicotine gum/lozenges, Chantix and Wellbutrin.     Nicotine patch caused visual hallucination and sweating  He has been using nicotine lozenges and cut back 2 PPD to 1/2 PPD.    Blanka Nolasco, APRN-CNP

## 2024-12-12 NOTE — TELEPHONE ENCOUNTER
Left message for patient . Appointment reminder, instructed to bring updated medication list for upcoming appointment with jorgito kinsey.

## 2024-12-12 NOTE — PATIENT INSTRUCTIONS
Recommend Mediterranean style of eating  Continue current medications  Work on quitting smoking  Follow-up with Dr. Briones in 6 months  If you have any questions or cardiac concerns, please call our office at [phone].

## 2024-12-13 ENCOUNTER — OFFICE VISIT (OUTPATIENT)
Dept: CARDIOLOGY | Facility: HOSPITAL | Age: 44
End: 2024-12-13
Payer: COMMERCIAL

## 2024-12-13 VITALS
WEIGHT: 201 LBS | SYSTOLIC BLOOD PRESSURE: 116 MMHG | BODY MASS INDEX: 33.49 KG/M2 | HEIGHT: 65 IN | DIASTOLIC BLOOD PRESSURE: 74 MMHG | OXYGEN SATURATION: 96 % | HEART RATE: 84 BPM

## 2024-12-13 DIAGNOSIS — E78.5 HYPERLIPIDEMIA, UNSPECIFIED HYPERLIPIDEMIA TYPE: Primary | ICD-10-CM

## 2024-12-13 DIAGNOSIS — I25.10 ASHD (ARTERIOSCLEROTIC HEART DISEASE): ICD-10-CM

## 2024-12-13 DIAGNOSIS — I21.3 STEMI (ST ELEVATION MYOCARDIAL INFARCTION) (MULTI): ICD-10-CM

## 2024-12-13 DIAGNOSIS — I10 HYPERTENSION, UNSPECIFIED TYPE: ICD-10-CM

## 2024-12-13 PROCEDURE — 4004F PT TOBACCO SCREEN RCVD TLK: CPT | Performed by: NURSE PRACTITIONER

## 2024-12-13 PROCEDURE — 99214 OFFICE O/P EST MOD 30 MIN: CPT | Performed by: NURSE PRACTITIONER

## 2024-12-13 PROCEDURE — 3078F DIAST BP <80 MM HG: CPT | Performed by: NURSE PRACTITIONER

## 2024-12-13 PROCEDURE — 3074F SYST BP LT 130 MM HG: CPT | Performed by: NURSE PRACTITIONER

## 2024-12-13 PROCEDURE — 3008F BODY MASS INDEX DOCD: CPT | Performed by: NURSE PRACTITIONER

## 2024-12-13 RX ORDER — ASPIRIN 81 MG/1
81 TABLET ORAL DAILY
Qty: 90 TABLET | Refills: 3 | Status: SHIPPED | OUTPATIENT
Start: 2024-12-13 | End: 2025-12-13

## 2024-12-13 RX ORDER — LISINOPRIL 5 MG/1
5 TABLET ORAL
Qty: 90 TABLET | Refills: 2 | Status: SHIPPED | OUTPATIENT
Start: 2024-12-13 | End: 2025-09-09

## 2024-12-13 RX ORDER — ATORVASTATIN CALCIUM 80 MG/1
80 TABLET, FILM COATED ORAL NIGHTLY
Qty: 90 TABLET | Refills: 3 | Status: SHIPPED | OUTPATIENT
Start: 2024-12-13 | End: 2025-12-13

## 2024-12-13 RX ORDER — METOPROLOL TARTRATE 25 MG/1
25 TABLET, FILM COATED ORAL 2 TIMES DAILY
Qty: 180 TABLET | Refills: 3 | Status: SHIPPED | OUTPATIENT
Start: 2024-12-13 | End: 2025-12-13

## 2024-12-13 ASSESSMENT — ENCOUNTER SYMPTOMS: DYSPNEA ON EXERTION: 1

## 2025-02-06 ENCOUNTER — TELEPHONE (OUTPATIENT)
Dept: CARDIOLOGY | Facility: HOSPITAL | Age: 45
End: 2025-02-06
Payer: COMMERCIAL

## 2025-02-06 NOTE — TELEPHONE ENCOUNTER
Called our pharmacy to see what a 7 Day supply would cost the patient out of pocket and it is $ 130, which is likely no doable for the patient. Can we switch him to an alternative? Pharmacy did not have recommendations populated for his specific plan.

## 2025-02-07 NOTE — TELEPHONE ENCOUNTER
Called patient and spoke to patients daughter. States its best to reach him at [phone] today. Called and LVM.

## 2025-02-13 ENCOUNTER — TELEPHONE (OUTPATIENT)
Dept: CARDIOLOGY | Facility: HOSPITAL | Age: 45
End: 2025-02-13
Payer: COMMERCIAL

## 2025-02-13 NOTE — TELEPHONE ENCOUNTER
Patient left VM wanting to talk to nurse about medications. He left a different number then what we had on file, so I corrected. He asked we please call that number and not his daughter.

## 2025-02-26 DIAGNOSIS — E78.5 HYPERLIPIDEMIA, UNSPECIFIED HYPERLIPIDEMIA TYPE: ICD-10-CM

## 2025-02-26 DIAGNOSIS — I25.10 ASHD (ARTERIOSCLEROTIC HEART DISEASE): ICD-10-CM

## 2025-02-26 DIAGNOSIS — I21.3 STEMI (ST ELEVATION MYOCARDIAL INFARCTION) (MULTI): ICD-10-CM

## 2025-02-26 NOTE — TELEPHONE ENCOUNTER
PT called and left message saying he would like to speak with Abigail about medication questions.

## 2025-02-27 RX ORDER — METOPROLOL TARTRATE 25 MG/1
25 TABLET, FILM COATED ORAL 2 TIMES DAILY
Qty: 180 TABLET | Refills: 1 | Status: SHIPPED | OUTPATIENT
Start: 2025-02-27 | End: 2026-02-27

## 2025-02-27 RX ORDER — ATORVASTATIN CALCIUM 80 MG/1
80 TABLET, FILM COATED ORAL NIGHTLY
Qty: 90 TABLET | Refills: 2 | Status: SHIPPED | OUTPATIENT
Start: 2025-02-27 | End: 2026-02-27

## 2025-02-27 RX ORDER — CLOPIDOGREL BISULFATE 75 MG/1
TABLET ORAL
Qty: 37 TABLET | Refills: 1 | Status: SHIPPED | OUTPATIENT
Start: 2025-02-27 | End: 2025-02-28 | Stop reason: SDUPTHER

## 2025-02-27 RX ORDER — LISINOPRIL 5 MG/1
5 TABLET ORAL
Qty: 90 TABLET | Refills: 1 | Status: SHIPPED | OUTPATIENT
Start: 2025-02-27 | End: 2025-11-24

## 2025-02-27 RX ORDER — ASPIRIN 81 MG/1
81 TABLET ORAL DAILY
Qty: 90 TABLET | Refills: 3 | Status: SHIPPED | OUTPATIENT
Start: 2025-02-27 | End: 2026-02-27

## 2025-02-27 NOTE — TELEPHONE ENCOUNTER
Patient called to inform us he now has medicaid and would like his Rx's sent to [facility].     Routing to Dr Briones.     Of note: Patient was switched from Brilinta to Plavix d/t cost.

## 2025-02-28 ENCOUNTER — TELEPHONE (OUTPATIENT)
Dept: CARDIOLOGY | Facility: HOSPITAL | Age: 45
End: 2025-02-28
Payer: COMMERCIAL

## 2025-02-28 NOTE — TELEPHONE ENCOUNTER
Pt called in and Lvm asking to speak with Abigail PALACIO. He said his pharmacy will not fill his medication because the wrong script was sent/the script was not correct.    Leilani ARIAS

## 2025-02-28 NOTE — TELEPHONE ENCOUNTER
Called patient and pharmacy. Rx sent was not incorrect. Pharm filled wrong Rx (2 were sent).     Corrected the confusion.

## 2025-03-31 ENCOUNTER — APPOINTMENT (OUTPATIENT)
Dept: CARDIAC REHAB | Facility: HOSPITAL | Age: 45
End: 2025-03-31
Payer: COMMERCIAL

## 2025-04-02 ENCOUNTER — APPOINTMENT (OUTPATIENT)
Dept: CARDIAC REHAB | Facility: HOSPITAL | Age: 45
End: 2025-04-02
Payer: COMMERCIAL

## 2025-04-07 ENCOUNTER — APPOINTMENT (OUTPATIENT)
Dept: CARDIAC REHAB | Facility: HOSPITAL | Age: 45
End: 2025-04-07
Payer: COMMERCIAL

## 2025-05-21 ENCOUNTER — PHARMACY VISIT (OUTPATIENT)
Dept: PHARMACY | Facility: CLINIC | Age: 45
End: 2025-05-21
Payer: MEDICAID

## 2025-05-21 ENCOUNTER — APPOINTMENT (OUTPATIENT)
Dept: RADIOLOGY | Facility: HOSPITAL | Age: 45
End: 2025-05-21
Payer: COMMERCIAL

## 2025-05-21 ENCOUNTER — HOSPITAL ENCOUNTER (EMERGENCY)
Facility: HOSPITAL | Age: 45
Discharge: HOME | End: 2025-05-21
Attending: EMERGENCY MEDICINE
Payer: COMMERCIAL

## 2025-05-21 VITALS
BODY MASS INDEX: 34.73 KG/M2 | OXYGEN SATURATION: 97 % | WEIGHT: 196 LBS | HEIGHT: 63 IN | HEART RATE: 77 BPM | TEMPERATURE: 98.6 F | DIASTOLIC BLOOD PRESSURE: 76 MMHG | RESPIRATION RATE: 20 BRPM | SYSTOLIC BLOOD PRESSURE: 141 MMHG

## 2025-05-21 DIAGNOSIS — S16.1XXA STRAIN OF NECK MUSCLE, INITIAL ENCOUNTER: ICD-10-CM

## 2025-05-21 DIAGNOSIS — S09.90XA HEAD INJURY, INITIAL ENCOUNTER: Primary | ICD-10-CM

## 2025-05-21 PROCEDURE — RXMED WILLOW AMBULATORY MEDICATION CHARGE

## 2025-05-21 PROCEDURE — 72125 CT NECK SPINE W/O DYE: CPT

## 2025-05-21 PROCEDURE — 72125 CT NECK SPINE W/O DYE: CPT | Performed by: RADIOLOGY

## 2025-05-21 PROCEDURE — 99284 EMERGENCY DEPT VISIT MOD MDM: CPT | Mod: 25 | Performed by: EMERGENCY MEDICINE

## 2025-05-21 PROCEDURE — 70450 CT HEAD/BRAIN W/O DYE: CPT

## 2025-05-21 PROCEDURE — 99285 EMERGENCY DEPT VISIT HI MDM: CPT | Mod: 25

## 2025-05-21 RX ORDER — NAPROXEN 500 MG/1
500 TABLET ORAL
Qty: 17 TABLET | Refills: 0 | Status: SHIPPED | OUTPATIENT
Start: 2025-05-21

## 2025-05-21 RX ORDER — CYCLOBENZAPRINE HCL 10 MG
10 TABLET ORAL 3 TIMES DAILY PRN
Qty: 17 TABLET | Refills: 0 | Status: SHIPPED | OUTPATIENT
Start: 2025-05-21

## 2025-05-21 ASSESSMENT — LIFESTYLE VARIABLES
HAVE YOU EVER FELT YOU SHOULD CUT DOWN ON YOUR DRINKING: NO
EVER HAD A DRINK FIRST THING IN THE MORNING TO STEADY YOUR NERVES TO GET RID OF A HANGOVER: NO
TOTAL SCORE: 0
EVER FELT BAD OR GUILTY ABOUT YOUR DRINKING: NO
EVER FELT BAD OR GUILTY ABOUT YOUR DRINKING: NO
HAVE PEOPLE ANNOYED YOU BY CRITICIZING YOUR DRINKING: NO
TOTAL SCORE: 0
EVER HAD A DRINK FIRST THING IN THE MORNING TO STEADY YOUR NERVES TO GET RID OF A HANGOVER: NO
HAVE YOU EVER FELT YOU SHOULD CUT DOWN ON YOUR DRINKING: NO
HAVE PEOPLE ANNOYED YOU BY CRITICIZING YOUR DRINKING: NO

## 2025-05-21 ASSESSMENT — COLUMBIA-SUICIDE SEVERITY RATING SCALE - C-SSRS
2. HAVE YOU ACTUALLY HAD ANY THOUGHTS OF KILLING YOURSELF?: NO
6. HAVE YOU EVER DONE ANYTHING, STARTED TO DO ANYTHING, OR PREPARED TO DO ANYTHING TO END YOUR LIFE?: NO
1. IN THE PAST MONTH, HAVE YOU WISHED YOU WERE DEAD OR WISHED YOU COULD GO TO SLEEP AND NOT WAKE UP?: NO

## 2025-05-21 ASSESSMENT — PAIN SCALES - GENERAL
PAINLEVEL_OUTOF10: 3
PAINLEVEL_OUTOF10: 4
PAINLEVEL_OUTOF10: 3
PAINLEVEL_OUTOF10: 2
PAINLEVEL_OUTOF10: 7

## 2025-05-21 ASSESSMENT — PAIN - FUNCTIONAL ASSESSMENT
PAIN_FUNCTIONAL_ASSESSMENT: 0-10

## 2025-05-21 ASSESSMENT — PAIN DESCRIPTION - LOCATION: LOCATION: NECK

## 2025-05-21 ASSESSMENT — PAIN DESCRIPTION - PAIN TYPE: TYPE: ACUTE PAIN

## 2025-05-21 NOTE — ED PROVIDER NOTES
Chief Complaint   Patient presents with   • Neck Pain     Lifted heavy stairs yesterday, was going to drop them, it hit him in the back of the head and cracked neck. Wasn't uncomfortable until he slept last night. Now unable to turn side to side.    • Head Injury       HPI       45 year old male presents to the Emergency Department today complaining of carrying a heavy object above his head yesterday when it kicked back and hit him in the back of the head and neck. Since that time he has had a throbbing type pain that has been constant, non-radiating and varies in intensity.  Denies any loss of consciousness or seizure-like activity. Denies any other injuries. Denies any associated fever, chills, headache, neck pain, chest pain, shortness of breath, abdominal pain, nausea, vomiting, diarrhea, constipation, or urinary symptoms.       History provided by:  Patient             Patient History   Medical History[1]  Surgical History[2]  Family History[3]  Social History[4]        Physical Exam  Constitutional:       Appearance: Normal appearance.   HENT:      Head: Normocephalic.      Right Ear: Tympanic membrane, ear canal and external ear normal.      Left Ear: Tympanic membrane, ear canal and external ear normal.      Ears:      Comments: Bilateral auditory canals are non-inflamed and non-reddened. Bilateral TMs are pearly gray with good light reflex. No hemotympanum or lobo signs noted.      Nose: Nose normal.      Comments: Septum midline without deviation. Turbinates are not inflamed and reddened. No septal hematoma noted.      Mouth/Throat:      Comments:  Mucous membranes are moist. All teeth are intact. Uvula midline without deviation rises upon phonation. Tonsils 1+ without exudate.   Eyes:      Comments: Bilateral conjunctiva are without injection, discharge, or drainage. PERRL with consensual pupil response bilaterally. EOM's are intact without any signs of entrapment. No hyphema or raccoon's eyes.   Neck:      Cardiovascular:      Rate and Rhythm: Normal rate and regular rhythm.      Pulses:           Radial pulses are 3+ on the right side and 3+ on the left side.      Heart sounds: Normal heart sounds. No murmur heard.     No friction rub. No gallop.   Pulmonary:      Effort: Pulmonary effort is normal.      Breath sounds: Normal breath sounds. No wheezing, rhonchi or rales.   Abdominal:      General: Abdomen is flat. Bowel sounds are normal.      Palpations: Abdomen is soft.      Tenderness: There is no right CVA tenderness, left CVA tenderness, guarding or rebound. Negative signs include Murillo's sign and McBurney's sign.   Musculoskeletal:      Cervical back: Full passive range of motion without pain, normal range of motion and neck supple.      Comments: No edema, cyanosis, or clubbing noted.   Lymphadenopathy:      Cervical: No cervical adenopathy.   Skin:     Comments: No rashes, lesions, petechiae, or purpura. No signs of infection.   Neurological:      Mental Status: He is alert and oriented to person, place, and time.      Comments: Alert and oriented to person, place, or time. Follows commands well. Push/pulls of upper and lower extremities are equally strong bilaterally. Gait is steady and strong.    Psychiatric:         Attention and Perception: Attention normal.         Mood and Affect: Mood normal.         Speech: Speech normal.         Labs Reviewed - No data to display    CT cervical spine wo IV contrast   Final Result   No evidence of an acute fracture or subluxation.        MACRO:   None.        Signed by: Jaxon Ervin 5/21/2025 9:07 AM   Dictation workstation:   [id number]      CT head wo IV contrast   Final Result   No evidence of an acute intracranial process.        MACRO:   None.        Signed by: Jaxon Ervin 5/21/2025 9:05 AM   Dictation workstation:   [id number]               ED Course & MDM   Diagnoses as of 05/21/25 1042   Head injury, initial encounter   Strain of neck muscle, initial  "encounter           Medical Decision Making  Patient was seen and evaluated by Dr. Padilla. Limited trauma for head injury on Plavix was activated. CT scans of his head and cervical spine without contrast show no evidence of an acute intracranial process with no evidence of an acute fracture or subluxation of the cervical spine. Instructed to ice and elevate the sore area as much as possible. Given prescriptions for Naprosyn and Flexeril. No contraindications to NSAIDs are noted. Follow up with their doctor in 3 days. Return if worse in any way. Discharged in stable condition with computer instructions. Requested a referral to a hand surgeon for his chronic \"tendon issues.\"    Diagnostic Impression:     1. Closed head injury    2. Acute cervical sprain    3. Prescription therapy            Your medication list        CONTINUE taking these medications        Instructions Last Dose Given Next Dose Due   BD Mariaelena 2nd Gen Pen Needle 32 gauge x 5/32\" needle  Generic drug: pen needle, diabetic           Easy Touch Twist Lancets 30 gauge misc  Generic drug: lancets           FreeStyle Eileen 3 Sensor device  Generic drug: blood-glucose sensor           True Metrix Glucose Meter misc  Generic drug: blood-glucose meter                  ASK your doctor about these medications        Instructions Last Dose Given Next Dose Due   albuterol 90 mcg/actuation inhaler           aspirin 81 mg EC tablet      Take 1 tablet (81 mg) by mouth once daily.       atorvastatin 80 mg tablet  Commonly known as: Lipitor      Take 1 tablet (80 mg) by mouth once daily at bedtime.       Breztri Aerosphere 160-9-4.8 mcg/actuation HFA aerosol inhaler  Generic drug: budesonide-glycopyr-formoterol           clopidogrel 75 mg tablet  Commonly known as: Plavix      Take 1 tablet (75 mg) by mouth once daily. Do not fill before March 10, 2025.       cyclobenzaprine 10 mg tablet  Commonly known as: Flexeril           docusate sodium 100 mg capsule  Commonly " known as: Colace           gabapentin 600 mg tablet  Commonly known as: Neurontin           lisinopril 5 mg tablet      Take 1 tablet (5 mg) by mouth early in the morning..       magnesium oxide 400 mg (241.3 mg elemental) tablet  Commonly known as: Mag-Ox           meloxicam 15 mg tablet  Commonly known as: Mobic           metoprolol tartrate 25 mg tablet  Commonly known as: Lopressor      Take 1 tablet (25 mg) by mouth 2 times a day.       Stimulant Laxative Plus 8.6-50 mg tablet  Generic drug: sennosides-docusate sodium           True Metrix Glucose Test Strip  Generic drug: blood sugar diagnostic           Trulicity 0.75 mg/0.5 mL pen injector  Generic drug: dulaglutide           Trulicity 3 mg/0.5 mL injection  Generic drug: dulaglutide           Trulicity 4.5 mg/0.5 mL pen injector  Generic drug: dulaglutide      inject the contents of one pen (4.5mg) under the skin once weekly                  Procedure  Procedures         [1]  Past Medical History:  Diagnosis Date   • Asthma    • Diabetes mellitus (Multi)    • Diabetic neuropathy (Multi)    • HTN (hypertension)    [2]  Past Surgical History:  Procedure Laterality Date   • CARDIAC CATHETERIZATION N/A 9/2/2024    Procedure: Left Heart Cath, No LV;  Surgeon: Nathanael Briones MD;  Location: [facility];  Service: Cardiovascular;  Laterality: N/A;   • CARDIAC CATHETERIZATION N/A 9/2/2024    Procedure: PCI;  Surgeon: Nathanael Briones MD;  Location: [facility];  Service: Cardiovascular;  Laterality: N/A;   • CARDIAC CATHETERIZATION N/A 9/2/2024    Procedure: IVUS - Coronary;  Surgeon: Nathanael Briones MD;  Location: [facility];  Service: Cardiovascular;  Laterality: N/A;   [3]  No family history on file.  [4]  Social History  Tobacco Use   • Smoking status: Every Day     Types: Cigarettes   • Smokeless tobacco: Never   Substance Use Topics   • Alcohol use: Never   • Drug use: Never      Paul Cardenas, DUC-CNP  05/21/25 1042

## 2025-05-23 ENCOUNTER — HOSPITAL ENCOUNTER (EMERGENCY)
Facility: HOSPITAL | Age: 45
Discharge: HOME | End: 2025-05-23
Attending: STUDENT IN AN ORGANIZED HEALTH CARE EDUCATION/TRAINING PROGRAM
Payer: COMMERCIAL

## 2025-05-23 ENCOUNTER — APPOINTMENT (OUTPATIENT)
Dept: RADIOLOGY | Facility: HOSPITAL | Age: 45
End: 2025-05-23
Payer: COMMERCIAL

## 2025-05-23 VITALS
SYSTOLIC BLOOD PRESSURE: 149 MMHG | HEIGHT: 63 IN | TEMPERATURE: 98.4 F | WEIGHT: 196 LBS | DIASTOLIC BLOOD PRESSURE: 89 MMHG | HEART RATE: 75 BPM | BODY MASS INDEX: 34.73 KG/M2 | OXYGEN SATURATION: 96 % | RESPIRATION RATE: 16 BRPM

## 2025-05-23 DIAGNOSIS — G56.01 CARPAL TUNNEL SYNDROME OF RIGHT WRIST: Primary | ICD-10-CM

## 2025-05-23 PROCEDURE — 4500999001 HC ED NO CHARGE

## 2025-05-23 PROCEDURE — 99283 EMERGENCY DEPT VISIT LOW MDM: CPT | Performed by: STUDENT IN AN ORGANIZED HEALTH CARE EDUCATION/TRAINING PROGRAM

## 2025-05-23 PROCEDURE — 73110 X-RAY EXAM OF WRIST: CPT | Mod: RT

## 2025-05-23 PROCEDURE — 2500000001 HC RX 250 WO HCPCS SELF ADMINISTERED DRUGS (ALT 637 FOR MEDICARE OP): Performed by: STUDENT IN AN ORGANIZED HEALTH CARE EDUCATION/TRAINING PROGRAM

## 2025-05-23 PROCEDURE — 73110 X-RAY EXAM OF WRIST: CPT | Mod: RIGHT SIDE | Performed by: RADIOLOGY

## 2025-05-23 RX ORDER — OXYCODONE HYDROCHLORIDE 5 MG/1
5 TABLET ORAL ONCE
Refills: 0 | Status: COMPLETED | OUTPATIENT
Start: 2025-05-23 | End: 2025-05-23

## 2025-05-23 RX ADMIN — OXYCODONE HYDROCHLORIDE 5 MG: 5 TABLET ORAL at 17:51

## 2025-05-23 ASSESSMENT — PAIN DESCRIPTION - LOCATION: LOCATION: HAND

## 2025-05-23 ASSESSMENT — PAIN SCALES - GENERAL: PAINLEVEL_OUTOF10: 7

## 2025-05-23 ASSESSMENT — LIFESTYLE VARIABLES
TOTAL SCORE: 0
EVER HAD A DRINK FIRST THING IN THE MORNING TO STEADY YOUR NERVES TO GET RID OF A HANGOVER: NO
HAVE PEOPLE ANNOYED YOU BY CRITICIZING YOUR DRINKING: NO
EVER FELT BAD OR GUILTY ABOUT YOUR DRINKING: NO
HAVE YOU EVER FELT YOU SHOULD CUT DOWN ON YOUR DRINKING: NO

## 2025-05-23 ASSESSMENT — PAIN - FUNCTIONAL ASSESSMENT: PAIN_FUNCTIONAL_ASSESSMENT: 0-10

## 2025-05-23 ASSESSMENT — PAIN DESCRIPTION - PAIN TYPE: TYPE: ACUTE PAIN

## 2025-05-23 ASSESSMENT — PAIN DESCRIPTION - ORIENTATION: ORIENTATION: RIGHT

## 2025-05-23 NOTE — ED PROVIDER NOTES
"Chief Complaint   Patient presents with    Numbness     Pt was recently seen here after HIA and discharged with a pulled muscle, sent home with pain meds and NSAID; pt states he's feeling pain in his right hand and numbness in his left hand; pt hx neuropathy.        HPI:  45 y.o. male ***    History provided by: {Historian:53725::\"patient\"}  Limitations to history: {Limitations:76495::\"none\"}    ------------------------------------------------------------------------------------------------------------------------------------------    Physical Exam:  T 36.9 °C (98.4 °F)  HR 75  /89  RR 16  O2 96 % None (Room air)    {DNR exam:75253}    ------------------------------------------------------------------------------------------------------------------------------------------    Medical Decision Making:  This patient is a 45 y.o. male ***     See ED course below for remainder of details.    {DNR [facility] complexity (Optional):93280}    ED Course:        Clinical Impression:  ***    Disposition:  {Dispostion:52054}    Archana Pizarro, DO  Emergency Medicine      "

## 2025-05-23 NOTE — ED TRIAGE NOTES
Pt arrives to ED from home via private vehicle. AxOx4. Pt was recently seen here after HIA and discharged with a pulled muscle, sent home with pain meds and NSAID; pt states he's feeling pain in his right hand and numbness in his left hand; pt hx neuropathy.

## 2025-06-04 ENCOUNTER — APPOINTMENT (OUTPATIENT)
Dept: ORTHOPEDIC SURGERY | Facility: CLINIC | Age: 45
End: 2025-06-04
Payer: COMMERCIAL

## 2025-06-15 NOTE — PROGRESS NOTES
"Counseling:  The patient was counseled regarding diagnostic results, instructions for management, risk factor reductions, prognosis, patient and family education, impressions, risks and benefits of treatment options and importance of compliance with treatment.      Chief Complaint:  The patient presents today for 6-month followup of CAD and hyperlipidemia.       History Of Present Illness:    Murali Love is a 45 y.o. male patient who presents today in the company of his fiance for 6-month followup of CAD and hyperlipidemia. His PMH is significant for asthma, CAD with h/o inferior STEMI s/p PCI of PDA 09/2024, hyperlipidemia, HTN and nicotine dependence. Over the past 6 months, the patient states that he has done well from a cardiac standpoint. He denies any CP, chest discomfort or SOB. BP has been stable. The patient is compliant with his prescribed medications.           Last Recorded Vitals:  Vitals:    06/16/25 1344   BP: 110/60   BP Location: Left arm   Patient Position: Sitting   BP Cuff Size: Adult   Pulse: 80   SpO2: 94%   Weight: 90.5 kg (199 lb 9.6 oz)   Height: 1.6 m (5' 3\")       Past Surgical History:  He has a past surgical history that includes Cardiac catheterization (N/A, 9/2/2024); Cardiac catheterization (N/A, 9/2/2024); and Cardiac catheterization (N/A, 9/2/2024).      Social History:  He reports that he has been smoking cigarettes. He has never used smokeless tobacco. He reports that he does not drink alcohol and does not use drugs.    Family History:  No family history on file.     Allergies:  Patient has no known allergies.    Outpatient Medications:  Current Outpatient Medications   Medication Instructions    albuterol 90 mcg/actuation inhaler 2 puffs, Every 6 hours PRN    aspirin 81 mg, oral, Daily    atorvastatin (LIPITOR) 80 mg, oral, Nightly    BD Mariaelena 2nd Gen Pen Needle 32 gauge x 5/32\" needle USE WITH INSULIN AS DIRECTED    [facility] 160-9-4.8 mcg/actuation HFA aerosol " inhaler take 2 puffs twice a day- RINSE MOUTH WELL AFTER EACH USE    clopidogrel (PLAVIX) 75 mg, oral, Daily    cyclobenzaprine (FLEXERIL) 10 mg    cyclobenzaprine (FLEXERIL) 10 mg, oral, 3 times daily PRN    docusate sodium (Colace) 100 mg capsule 1 capsule, Every 12 hours scheduled (0630,1830)    dulaglutide (Trulicity) 4.5 mg/0.5 mL pen injector inject the contents of one pen (4.5mg) under the skin once weekly    Easy Touch Twist Lancets 30 gauge misc test blood sugar 1-2 times per day    FreeStyle Eileen 3 Sensor device USE TO CHECK BLOOD SUGARS FOUR TIMES DAILY BEFORE MELAS AND BEFORE BED    gabapentin (Neurontin) 600 mg tablet 1 tablet, 3 times daily (0900,1400,1900)    lisinopril 5 mg, oral, Daily (0630)    magnesium oxide (MAG-OX) 400 mg, Once PRN Procedure    meloxicam (Mobic) 15 mg tablet 1 tablet, Daily (0630)    metoprolol tartrate (LOPRESSOR) 25 mg, oral, 2 times daily    naproxen (NAPROSYN) 500 mg, oral, 2 times daily (morning and late afternoon)    Stimulant Laxative Plus 8.6-50 mg tablet take 1-2 tabs by mouth daily as needed for constipation    True Metrix Glucose Meter misc test blood sugar 1-2 times per day    True Metrix Glucose Test Strip strip test blood sugar 1-2 times a day and as needed    Trulicity 3 mg/0.5 mL pen injector inject 3mg once weekly subq in upper arm,thigh or abdomen and rotate sites    Trulicity 0.75 mg, Once Weekly     Review of Systems   All other systems reviewed and are negative.     Physical Exam:  Constitutional:       Appearance: Healthy appearance. Not in distress.   Neck:      Vascular: No JVR. JVD normal.   Pulmonary:      Effort: Pulmonary effort is normal.      Breath sounds: Normal breath sounds. No wheezing. No rhonchi. No rales.   Chest:      Chest wall: Not tender to palpatation.   Cardiovascular:      PMI at left midclavicular line. Normal rate. Regular rhythm. Normal S1. Normal S2.       Murmurs: There is no murmur.      No gallop.  No click. No rub.    Pulses:     Intact distal pulses.   Edema:     Peripheral edema absent.   Abdominal:      General: Bowel sounds are normal.      Palpations: Abdomen is soft.      Tenderness: There is no abdominal tenderness.   Musculoskeletal: Normal range of motion.         General: No tenderness. Skin:     General: Skin is warm and dry.   Neurological:      General: No focal deficit present.      Mental Status: Alert and oriented to person, place and time.          Last Labs:  CBC -  Lab Results   Component Value Date    WBC 13.5 (H) 09/03/2024    HGB 16.6 09/03/2024    HCT 49.2 09/03/2024    MCV 85 09/03/2024     09/03/2024       CMP -  Lab Results   Component Value Date    CALCIUM 8.6 09/03/2024    PROT 7.3 09/02/2024    ALBUMIN 4.6 09/02/2024    AST 25 09/02/2024    ALT 25 09/02/2024    ALKPHOS 75 09/02/2024    BILITOT 0.6 09/02/2024       LIPID PANEL -   Lab Results   Component Value Date    CHOL 118 09/03/2024    TRIG 214 (H) 09/03/2024    HDL 27.5 09/03/2024    CHHDL 4.3 09/03/2024    VLDL 43 (H) 09/03/2024    NHDL 91 09/03/2024       RENAL FUNCTION PANEL -   Lab Results   Component Value Date    GLUCOSE 180 (H) 09/03/2024     09/03/2024    K 3.8 09/03/2024     09/03/2024    CO2 26 09/03/2024    ANIONGAP 8 (L) 09/03/2024    BUN 17 09/03/2024    CREATININE 1.25 09/03/2024    CALCIUM 8.6 09/03/2024    ALBUMIN 4.6 09/02/2024        Lab Results   Component Value Date    HGBA1C 7.4 (H) 09/02/2024       Last Cardiology Tests:  09/03/2024 - TTE  1. Left ventricular ejection fraction is normal, calculated by Mascorro's biplane at 57%.  2. There is normal right ventricular global systolic function.    09/02/2024 - Cardiac Catheterization (LH)  1. Single vessel disease.  2. The right posterior descending artery showed severe atherosclerotic disease.  3. Culprit vessel(s): right posterior descending artery.  4. Successful PCI of PDA.  5. Left Ventricular end-diastolic pressure = 10.  6. Normal LV filling  pressures.    Lab review: I have personally reviewed the laboratory result(s).    Assessment/Plan   1) STEMI s/p PCI of PDA  On DAPT - ASA 81 mg daily, Plavix 75 mg daily   On atorvastatin 80 mg daily, lisinopril 5 mg daily, metoprolol tartrate 25 mg BID   Hospital admission 09/02/2024 to 09/03/2024 with chest pain  EKG showed inferior STEMI with reciprocal depression in aVF  [facility] with single vessel disease s/p PCI of PDA  TTE with LVEF 57%  Denies CP, chest discomfort or SOB  BP stable  Continue current medical Rx   F/U 1 year     2) Hyperlipidemia  Goal LDL <70  On atorvastatin 80 mg daily  Most recent lipid panel drawn 06/13/2025 with total cholesterol, LDL and triglycerides of 148, 82 and 234 respectively - patient brought in copies of results from [facility].  Continue current medical Rx   F/U 1 year       Scribe Attestation  By signing my name below, I, Onelia Devine   attest that this documentation has been prepared under the direction and in the presence of Nathanael Briones MD.

## 2025-06-16 ENCOUNTER — OFFICE VISIT (OUTPATIENT)
Dept: CARDIOLOGY | Facility: HOSPITAL | Age: 45
End: 2025-06-16
Payer: COMMERCIAL

## 2025-06-16 VITALS
BODY MASS INDEX: 35.37 KG/M2 | HEART RATE: 80 BPM | WEIGHT: 199.6 LBS | HEIGHT: 63 IN | SYSTOLIC BLOOD PRESSURE: 110 MMHG | DIASTOLIC BLOOD PRESSURE: 60 MMHG | OXYGEN SATURATION: 94 %

## 2025-06-16 DIAGNOSIS — E78.5 HYPERLIPIDEMIA, UNSPECIFIED HYPERLIPIDEMIA TYPE: ICD-10-CM

## 2025-06-16 DIAGNOSIS — I10 HYPERTENSION, UNSPECIFIED TYPE: ICD-10-CM

## 2025-06-16 DIAGNOSIS — I25.10 ASHD (ARTERIOSCLEROTIC HEART DISEASE): ICD-10-CM

## 2025-06-16 PROCEDURE — 3078F DIAST BP <80 MM HG: CPT | Performed by: INTERNAL MEDICINE

## 2025-06-16 PROCEDURE — 99212 OFFICE O/P EST SF 10 MIN: CPT | Performed by: INTERNAL MEDICINE

## 2025-06-16 PROCEDURE — 3074F SYST BP LT 130 MM HG: CPT | Performed by: INTERNAL MEDICINE

## 2025-06-16 PROCEDURE — 99213 OFFICE O/P EST LOW 20 MIN: CPT | Performed by: INTERNAL MEDICINE

## 2025-06-16 PROCEDURE — 3008F BODY MASS INDEX DOCD: CPT | Performed by: INTERNAL MEDICINE

## 2025-06-16 RX ORDER — LISINOPRIL 5 MG/1
5 TABLET ORAL
Qty: 90 TABLET | Refills: 3 | Status: SHIPPED | OUTPATIENT
Start: 2025-06-16 | End: 2026-06-16

## 2025-06-16 NOTE — PATIENT INSTRUCTIONS
Continue all current medications as prescribed. Refills for lisinopril have been sent to your pharmacy as per your request.  Watch carbohydrate intake (rice, potatoes, pasta, bread, ice-cream all within moderation); increase greens, veggies, fiber, lean protein (fish, turkey, chicken; baked/boiled/grilled, not fried) and fruit.   Followup with Dr. Briones in 1 year, sooner should any issues or concerns arise before then.     If you have any questions or cardiac concerns, please call our office at [phone].

## 2025-06-24 NOTE — TELEPHONE ENCOUNTER
Problem: PAIN - ADULT  Goal: Verbalizes/displays adequate comfort level or baseline comfort level  Description: Interventions:  - Encourage patient to monitor pain and request assistance  - Assess pain using appropriate pain scale  - Administer analgesics as ordered based on type and severity of pain and evaluate response  - Implement non-pharmacological measures as appropriate and evaluate response  - Consider cultural and social influences on pain and pain management  - Notify physician/advanced practitioner if interventions unsuccessful or patient reports new pain  - Educate patient/family on pain management process including their role and importance of  reporting pain   - Provide non-pharmacologic/complimentary pain relief interventions  Outcome: Progressing     Problem: Knowledge Deficit  Goal: Patient/family/caregiver demonstrates understanding of disease process, treatment plan, medications, and discharge instructions  Description: Complete learning assessment and assess knowledge base.  Interventions:  - Provide teaching at level of understanding  - Provide teaching via preferred learning methods  Outcome: Progressing      Patient no longer has Medicaid and is completley out of [facility] and wants to know if there is anything similar and cheaper patient can have.

## 2025-07-02 ENCOUNTER — APPOINTMENT (OUTPATIENT)
Dept: CARDIOLOGY | Facility: HOSPITAL | Age: 45
End: 2025-07-02
Payer: COMMERCIAL

## 2025-07-02 ENCOUNTER — APPOINTMENT (OUTPATIENT)
Dept: RADIOLOGY | Facility: HOSPITAL | Age: 45
End: 2025-07-02
Payer: COMMERCIAL

## 2025-07-02 ENCOUNTER — HOSPITAL ENCOUNTER (EMERGENCY)
Facility: HOSPITAL | Age: 45
Discharge: HOME | End: 2025-07-02
Payer: COMMERCIAL

## 2025-07-02 ENCOUNTER — TELEPHONE (OUTPATIENT)
Dept: HEMATOLOGY/ONCOLOGY | Facility: HOSPITAL | Age: 45
End: 2025-07-02
Payer: COMMERCIAL

## 2025-07-02 VITALS
DIASTOLIC BLOOD PRESSURE: 86 MMHG | TEMPERATURE: 97.5 F | OXYGEN SATURATION: 96 % | HEIGHT: 63 IN | BODY MASS INDEX: 34.73 KG/M2 | RESPIRATION RATE: 19 BRPM | SYSTOLIC BLOOD PRESSURE: 138 MMHG | HEART RATE: 81 BPM | WEIGHT: 196 LBS

## 2025-07-02 DIAGNOSIS — R91.1 LUNG NODULE: ICD-10-CM

## 2025-07-02 DIAGNOSIS — E27.8 ADRENAL MASS (MULTI): Primary | ICD-10-CM

## 2025-07-02 DIAGNOSIS — R06.02 SHORTNESS OF BREATH: ICD-10-CM

## 2025-07-02 DIAGNOSIS — R10.9 ABDOMINAL PAIN, UNSPECIFIED ABDOMINAL LOCATION: Primary | ICD-10-CM

## 2025-07-02 DIAGNOSIS — E27.8 ADRENAL MASS 1 CM TO 4 CM IN DIAMETER: ICD-10-CM

## 2025-07-02 DIAGNOSIS — R91.1 PULMONARY NODULE: ICD-10-CM

## 2025-07-02 LAB
ALBUMIN SERPL BCP-MCNC: 4.1 G/DL (ref 3.4–5)
ALP SERPL-CCNC: 68 U/L (ref 33–120)
ALT SERPL W P-5'-P-CCNC: 19 U/L (ref 10–52)
ANION GAP SERPL CALC-SCNC: 8 MMOL/L (ref 10–20)
AST SERPL W P-5'-P-CCNC: 16 U/L (ref 9–39)
ATRIAL RATE: 88 BPM
BASOPHILS # BLD AUTO: 0.08 X10*3/UL (ref 0–0.1)
BASOPHILS NFR BLD AUTO: 0.7 %
BILIRUB SERPL-MCNC: 0.7 MG/DL (ref 0–1.2)
BNP SERPL-MCNC: 7 PG/ML (ref 0–99)
BUN SERPL-MCNC: 19 MG/DL (ref 6–23)
CALCIUM SERPL-MCNC: 9.2 MG/DL (ref 8.6–10.3)
CARDIAC TROPONIN I PNL SERPL HS: 4 NG/L (ref 0–20)
CHLORIDE SERPL-SCNC: 103 MMOL/L (ref 98–107)
CO2 SERPL-SCNC: 27 MMOL/L (ref 21–32)
CREAT SERPL-MCNC: 1.15 MG/DL (ref 0.5–1.3)
EGFRCR SERPLBLD CKD-EPI 2021: 79 ML/MIN/1.73M*2
EOSINOPHIL # BLD AUTO: 0.4 X10*3/UL (ref 0–0.7)
EOSINOPHIL NFR BLD AUTO: 3.6 %
ERYTHROCYTE [DISTWIDTH] IN BLOOD BY AUTOMATED COUNT: 14.5 % (ref 11.5–14.5)
GLUCOSE SERPL-MCNC: 134 MG/DL (ref 74–99)
HCT VFR BLD AUTO: 49.2 % (ref 41–52)
HGB BLD-MCNC: 16.7 G/DL (ref 13.5–17.5)
IMM GRANULOCYTES # BLD AUTO: 0.03 X10*3/UL (ref 0–0.7)
IMM GRANULOCYTES NFR BLD AUTO: 0.3 % (ref 0–0.9)
LACTATE SERPL-SCNC: 1.2 MMOL/L (ref 0.4–2)
LIPASE SERPL-CCNC: 19 U/L (ref 9–82)
LYMPHOCYTES # BLD AUTO: 3.31 X10*3/UL (ref 1.2–4.8)
LYMPHOCYTES NFR BLD AUTO: 30.1 %
MCH RBC QN AUTO: 28.5 PG (ref 26–34)
MCHC RBC AUTO-ENTMCNC: 33.9 G/DL (ref 32–36)
MCV RBC AUTO: 84 FL (ref 80–100)
MONOCYTES # BLD AUTO: 0.74 X10*3/UL (ref 0.1–1)
MONOCYTES NFR BLD AUTO: 6.7 %
NEUTROPHILS # BLD AUTO: 6.45 X10*3/UL (ref 1.2–7.7)
NEUTROPHILS NFR BLD AUTO: 58.6 %
NRBC BLD-RTO: 0 /100 WBCS (ref 0–0)
P AXIS: 80 DEGREES
PLATELET # BLD AUTO: 203 X10*3/UL (ref 150–450)
POTASSIUM SERPL-SCNC: 4.2 MMOL/L (ref 3.5–5.3)
PR INTERVAL: 136 MS
PROT SERPL-MCNC: 6.5 G/DL (ref 6.4–8.2)
Q ONSET: 251 MS
QRS COUNT: 15 BEATS
QRS DURATION: 101 MS
QT INTERVAL: 353 MS
QTC CALCULATION(BAZETT): 427 MS
QTC FREDERICIA: 401 MS
R AXIS: 90 DEGREES
RBC # BLD AUTO: 5.86 X10*6/UL (ref 4.5–5.9)
SODIUM SERPL-SCNC: 134 MMOL/L (ref 136–145)
T AXIS: 48 DEGREES
T OFFSET: 427 MS
VENTRICULAR RATE: 88 BPM
WBC # BLD AUTO: 11 X10*3/UL (ref 4.4–11.3)

## 2025-07-02 PROCEDURE — 71045 X-RAY EXAM CHEST 1 VIEW: CPT

## 2025-07-02 PROCEDURE — 74177 CT ABD & PELVIS W/CONTRAST: CPT | Performed by: RADIOLOGY

## 2025-07-02 PROCEDURE — 36415 COLL VENOUS BLD VENIPUNCTURE: CPT | Performed by: PHYSICIAN ASSISTANT

## 2025-07-02 PROCEDURE — 80053 COMPREHEN METABOLIC PANEL: CPT | Performed by: PHYSICIAN ASSISTANT

## 2025-07-02 PROCEDURE — 99285 EMERGENCY DEPT VISIT HI MDM: CPT | Mod: 25

## 2025-07-02 PROCEDURE — 84484 ASSAY OF TROPONIN QUANT: CPT | Performed by: PHYSICIAN ASSISTANT

## 2025-07-02 PROCEDURE — 83690 ASSAY OF LIPASE: CPT | Performed by: PHYSICIAN ASSISTANT

## 2025-07-02 PROCEDURE — 83880 ASSAY OF NATRIURETIC PEPTIDE: CPT | Performed by: PHYSICIAN ASSISTANT

## 2025-07-02 PROCEDURE — 2550000001 HC RX 255 CONTRASTS: Performed by: PHYSICIAN ASSISTANT

## 2025-07-02 PROCEDURE — 85025 COMPLETE CBC W/AUTO DIFF WBC: CPT | Performed by: PHYSICIAN ASSISTANT

## 2025-07-02 PROCEDURE — 93005 ELECTROCARDIOGRAM TRACING: CPT

## 2025-07-02 PROCEDURE — 83605 ASSAY OF LACTIC ACID: CPT | Performed by: PHYSICIAN ASSISTANT

## 2025-07-02 PROCEDURE — 74177 CT ABD & PELVIS W/CONTRAST: CPT

## 2025-07-02 RX ADMIN — IOHEXOL 75 ML: 350 INJECTION, SOLUTION INTRAVENOUS at 13:28

## 2025-07-02 ASSESSMENT — LIFESTYLE VARIABLES
EVER FELT BAD OR GUILTY ABOUT YOUR DRINKING: NO
EVER HAD A DRINK FIRST THING IN THE MORNING TO STEADY YOUR NERVES TO GET RID OF A HANGOVER: NO
HAVE PEOPLE ANNOYED YOU BY CRITICIZING YOUR DRINKING: NO
TOTAL SCORE: 0
HAVE YOU EVER FELT YOU SHOULD CUT DOWN ON YOUR DRINKING: NO

## 2025-07-02 ASSESSMENT — PAIN SCALES - GENERAL
PAINLEVEL_OUTOF10: 0 - NO PAIN
PAINLEVEL_OUTOF10: 6

## 2025-07-02 ASSESSMENT — PAIN DESCRIPTION - PAIN TYPE: TYPE: ACUTE PAIN

## 2025-07-02 ASSESSMENT — PAIN DESCRIPTION - LOCATION: LOCATION: ABDOMEN

## 2025-07-02 ASSESSMENT — PAIN - FUNCTIONAL ASSESSMENT: PAIN_FUNCTIONAL_ASSESSMENT: 0-10

## 2025-07-02 ASSESSMENT — PAIN DESCRIPTION - ORIENTATION: ORIENTATION: LOWER

## 2025-07-02 NOTE — ED TRIAGE NOTES
Pt. Arrived to the ED via private car for c/o abdominal pain and SOB. Per pt the pain is all lower abdominal and has been going on for the last 3 days. Pt. Was concerned for constipation but states he has had two bowel movements today. Denies any nausea or vomiting, but states he had been burping all day. Pt. States that he also feels more SOB which started today. HX of MI in 2024 and is blood thinner

## 2025-07-02 NOTE — TELEPHONE ENCOUNTER
Spoke with patient, he is aware of incidental finding of adrenal mass. He is open to NPV with Dr. Samuels with general surgery, prefers main campus location, prefers scheduling call him. Will reach out to scheduling.     He is aware of lung nodule but does not currently have surveillance of any kind, would like referral to [facility]. Will notify CARLY Lombardo. Gave him our phone number, no further needs at this time.

## 2025-07-02 NOTE — TELEPHONE ENCOUNTER
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 Advise referral to Dr. Samuels, general surgery, for further evaluation. Per chart review, 1 cm RLL pulmonary nodule also noted, which needs further evaluation. Per ED provider's note, Mr. Love is already aware of this finding & undergoing evaluation. Advise follow-up with [facility] if not currently managed & we will gladly place referral for incidentals lung nodule nurse navigators to reach out to him, if care needed.  DUC Henry.CNP  [facility]

## 2025-07-16 ENCOUNTER — APPOINTMENT (OUTPATIENT)
Dept: SLEEP MEDICINE | Facility: CLINIC | Age: 45
End: 2025-07-16
Payer: COMMERCIAL

## 2025-07-21 LAB
ATRIAL RATE: 88 BPM
P AXIS: 80 DEGREES
PR INTERVAL: 136 MS
Q ONSET: 251 MS
QRS COUNT: 15 BEATS
QRS DURATION: 101 MS
QT INTERVAL: 353 MS
QTC CALCULATION(BAZETT): 427 MS
QTC FREDERICIA: 401 MS
R AXIS: 90 DEGREES
T AXIS: 48 DEGREES
T OFFSET: 427 MS
VENTRICULAR RATE: 88 BPM

## 2025-07-26 ENCOUNTER — APPOINTMENT (OUTPATIENT)
Dept: SLEEP MEDICINE | Facility: CLINIC | Age: 45
End: 2025-07-26
Payer: COMMERCIAL

## 2025-08-07 ENCOUNTER — TELEPHONE (OUTPATIENT)
Dept: SURGERY | Facility: CLINIC | Age: 45
End: 2025-08-07
Payer: COMMERCIAL

## 2025-08-07 DIAGNOSIS — E27.9 ADRENAL NODULE: ICD-10-CM

## 2025-08-07 NOTE — TELEPHONE ENCOUNTER
Call to patient. No answer. Left voicemail message notifying patient that Dr. Samuels has ordered labs for the patient to complete before NPV scheduled 8/27/25. Patient instructed to proceed to [facility] or  lab of his choice for completion 7 days prior to visit. Callback number provided.

## 2025-08-19 ENCOUNTER — OFFICE VISIT (OUTPATIENT)
Dept: PULMONOLOGY | Facility: HOSPITAL | Age: 45
End: 2025-08-19
Payer: COMMERCIAL

## 2025-08-19 VITALS
HEART RATE: 84 BPM | RESPIRATION RATE: 16 BRPM | WEIGHT: 202.3 LBS | TEMPERATURE: 98 F | OXYGEN SATURATION: 91 % | DIASTOLIC BLOOD PRESSURE: 70 MMHG | HEIGHT: 65 IN | BODY MASS INDEX: 33.7 KG/M2 | SYSTOLIC BLOOD PRESSURE: 105 MMHG

## 2025-08-19 DIAGNOSIS — J30.9 ALLERGIC RHINITIS, UNSPECIFIED SEASONALITY, UNSPECIFIED TRIGGER: ICD-10-CM

## 2025-08-19 DIAGNOSIS — R91.1 LUNG NODULE: Primary | ICD-10-CM

## 2025-08-19 DIAGNOSIS — J45.909 ASTHMA, UNSPECIFIED ASTHMA SEVERITY, UNSPECIFIED WHETHER COMPLICATED, UNSPECIFIED WHETHER PERSISTENT (HHS-HCC): ICD-10-CM

## 2025-08-19 PROCEDURE — 99204 OFFICE O/P NEW MOD 45 MIN: CPT | Performed by: NURSE PRACTITIONER

## 2025-08-19 PROCEDURE — 3078F DIAST BP <80 MM HG: CPT | Performed by: NURSE PRACTITIONER

## 2025-08-19 PROCEDURE — 99212 OFFICE O/P EST SF 10 MIN: CPT

## 2025-08-19 PROCEDURE — 3074F SYST BP LT 130 MM HG: CPT | Performed by: NURSE PRACTITIONER

## 2025-08-19 PROCEDURE — 3008F BODY MASS INDEX DOCD: CPT | Performed by: NURSE PRACTITIONER

## 2025-08-19 RX ORDER — ALBUTEROL SULFATE 90 UG/1
4 INHALANT RESPIRATORY (INHALATION) ONCE
OUTPATIENT
Start: 2025-08-19

## 2025-08-19 RX ORDER — ALBUTEROL SULFATE 0.83 MG/ML
3 SOLUTION RESPIRATORY (INHALATION) ONCE
OUTPATIENT
Start: 2025-08-19 | End: 2025-08-19

## 2025-08-19 ASSESSMENT — COLUMBIA-SUICIDE SEVERITY RATING SCALE - C-SSRS
1. IN THE PAST MONTH, HAVE YOU WISHED YOU WERE DEAD OR WISHED YOU COULD GO TO SLEEP AND NOT WAKE UP?: NO
6. HAVE YOU EVER DONE ANYTHING, STARTED TO DO ANYTHING, OR PREPARED TO DO ANYTHING TO END YOUR LIFE?: NO
2. HAVE YOU ACTUALLY HAD ANY THOUGHTS OF KILLING YOURSELF?: NO

## 2025-08-19 ASSESSMENT — PATIENT HEALTH QUESTIONNAIRE - PHQ9
2. FEELING DOWN, DEPRESSED OR HOPELESS: NOT AT ALL
SUM OF ALL RESPONSES TO PHQ9 QUESTIONS 1 AND 2: 0
1. LITTLE INTEREST OR PLEASURE IN DOING THINGS: NOT AT ALL

## 2025-08-19 ASSESSMENT — ENCOUNTER SYMPTOMS
OCCASIONAL FEELINGS OF UNSTEADINESS: 0
COUGH: 1
CHILLS: 0
SHORTNESS OF BREATH: 1
FEVER: 0
FATIGUE: 0
WHEEZING: 0
LOSS OF SENSATION IN FEET: 0
UNEXPECTED WEIGHT CHANGE: 0
RHINORRHEA: 0
DEPRESSION: 0

## 2025-08-27 ENCOUNTER — APPOINTMENT (OUTPATIENT)
Dept: SURGERY | Facility: CLINIC | Age: 45
End: 2025-08-27
Payer: COMMERCIAL

## 2025-09-05 ENCOUNTER — APPOINTMENT (OUTPATIENT)
Dept: RESPIRATORY THERAPY | Facility: HOSPITAL | Age: 45
End: 2025-09-05
Payer: COMMERCIAL

## 2025-09-15 ENCOUNTER — APPOINTMENT (OUTPATIENT)
Facility: CLINIC | Age: 45
End: 2025-09-15
Payer: COMMERCIAL

## 2025-11-11 ENCOUNTER — APPOINTMENT (OUTPATIENT)
Dept: PULMONOLOGY | Facility: HOSPITAL | Age: 45
End: 2025-11-11
Payer: COMMERCIAL

## (undated) DEVICE — TR BAND, RADIAL COMPRESSION, LONG, 29CM

## (undated) DEVICE — GUIDEWIRE, UNIVERSAL BALANCE MID WEIGHT

## (undated) DEVICE — Device

## (undated) DEVICE — CATHETER, DIAGNOSTIC, DXTERITY, 6FR 100CM, JL35

## (undated) DEVICE — GUIDEWIRE, INQWIRE, 3MM J, .035 X 210CM, FIXED

## (undated) DEVICE — CATHETER, GUIDING, LAUNCHER, 6FR, JR 4.0

## (undated) DEVICE — DEVICE, INFLATION, PRESTO ID40ATM 30CC

## (undated) DEVICE — SHEATH, GLIDESHEATH, SLENDER, 6FR 10CM

## (undated) DEVICE — CATHETER, EAGLE EYE, PLATNIUM (IVUS)

## (undated) DEVICE — TR BAND, RADIAL COMPRESSION, STANDARD, 24CM